# Patient Record
Sex: FEMALE | Race: WHITE | Employment: FULL TIME | URBAN - METROPOLITAN AREA
[De-identification: names, ages, dates, MRNs, and addresses within clinical notes are randomized per-mention and may not be internally consistent; named-entity substitution may affect disease eponyms.]

---

## 2017-07-03 PROBLEM — Z53.20 REFUSAL OF STATIN MEDICATION BY PATIENT: Status: ACTIVE | Noted: 2017-07-03

## 2017-07-03 PROBLEM — E11.3293 MILD NONPROLIFERATIVE DIABETIC RETINOPATHY OF BOTH EYES WITHOUT MACULAR EDEMA ASSOCIATED WITH TYPE 2 DIABETES MELLITUS (HCC): Status: ACTIVE | Noted: 2017-07-03

## 2017-07-03 PROBLEM — Z53.29 REFUSAL OF STATIN MEDICATION BY PATIENT: Status: ACTIVE | Noted: 2017-07-03

## 2017-07-10 PROCEDURE — 82570 ASSAY OF URINE CREATININE: CPT | Performed by: INTERNAL MEDICINE

## 2017-07-10 PROCEDURE — 82043 UR ALBUMIN QUANTITATIVE: CPT | Performed by: INTERNAL MEDICINE

## 2017-08-07 PROBLEM — H26.9 CORTICAL CATARACT OF BOTH EYES: Status: ACTIVE | Noted: 2017-08-07

## 2017-08-07 PROBLEM — E11.9 DIABETES MELLITUS TYPE 2 WITHOUT RETINOPATHY (HCC): Status: ACTIVE | Noted: 2017-08-07

## 2017-08-07 PROBLEM — E11.3293 MILD NONPROLIFERATIVE DIABETIC RETINOPATHY OF BOTH EYES WITHOUT MACULAR EDEMA ASSOCIATED WITH TYPE 2 DIABETES MELLITUS (HCC): Status: RESOLVED | Noted: 2017-07-03 | Resolved: 2017-08-07

## 2018-02-19 PROBLEM — Z86.39 HX OF DIABETIC RETINOPATHY: Status: ACTIVE | Noted: 2018-02-19

## 2018-02-19 PROBLEM — E11.9 DIABETES MELLITUS TYPE 2 WITHOUT RETINOPATHY (HCC): Status: RESOLVED | Noted: 2017-08-07 | Resolved: 2018-02-19

## 2018-03-14 PROCEDURE — 82043 UR ALBUMIN QUANTITATIVE: CPT | Performed by: INTERNAL MEDICINE

## 2018-03-14 PROCEDURE — 82570 ASSAY OF URINE CREATININE: CPT | Performed by: INTERNAL MEDICINE

## 2018-04-16 PROCEDURE — 87086 URINE CULTURE/COLONY COUNT: CPT | Performed by: OBSTETRICS & GYNECOLOGY

## 2018-05-14 PROCEDURE — 87086 URINE CULTURE/COLONY COUNT: CPT | Performed by: OBSTETRICS & GYNECOLOGY

## 2018-06-06 PROBLEM — B37.31 VAGINAL CANDIDIASIS: Status: ACTIVE | Noted: 2018-06-06

## 2018-06-06 PROBLEM — B37.3 VAGINAL CANDIDIASIS: Status: ACTIVE | Noted: 2018-06-06

## 2018-06-06 PROCEDURE — 81015 MICROSCOPIC EXAM OF URINE: CPT | Performed by: UROLOGY

## 2018-12-10 PROBLEM — B37.3 VAGINAL CANDIDIASIS: Status: RESOLVED | Noted: 2018-06-06 | Resolved: 2018-12-10

## 2018-12-10 PROBLEM — B37.31 VAGINAL CANDIDIASIS: Status: RESOLVED | Noted: 2018-06-06 | Resolved: 2018-12-10

## 2020-10-19 PROBLEM — Z86.16 HISTORY OF 2019 NOVEL CORONAVIRUS DISEASE (COVID-19): Status: ACTIVE | Noted: 2020-10-19

## 2021-08-09 PROBLEM — Z79.4 TYPE 2 DIABETES MELLITUS WITH HYPERGLYCEMIA, WITH LONG-TERM CURRENT USE OF INSULIN (HCC): Status: ACTIVE | Noted: 2021-08-09

## 2021-08-09 PROBLEM — E11.65 TYPE 2 DIABETES MELLITUS WITH HYPERGLYCEMIA, WITH LONG-TERM CURRENT USE OF INSULIN (HCC): Status: ACTIVE | Noted: 2021-08-09

## 2022-10-13 ENCOUNTER — HOSPITAL ENCOUNTER (EMERGENCY)
Facility: HOSPITAL | Age: 57
Discharge: HOME | End: 2022-10-14
Attending: EMERGENCY MEDICINE
Payer: COMMERCIAL

## 2022-10-13 ENCOUNTER — APPOINTMENT (OUTPATIENT)
Dept: CT IMAGING | Facility: HOSPITAL | Age: 57
End: 2022-10-13
Attending: EMERGENCY MEDICINE
Payer: COMMERCIAL

## 2022-10-13 DIAGNOSIS — K64.4 EXTERNAL HEMORRHOIDS: ICD-10-CM

## 2022-10-13 DIAGNOSIS — K56.41 IMPACTED STOOL IN RECTUM (HCC): Primary | ICD-10-CM

## 2022-10-13 LAB
ALBUMIN SERPL-MCNC: 3.7 G/DL (ref 3.4–5)
ALBUMIN/GLOB SERPL: 1.1 {RATIO} (ref 1–2)
ALP LIVER SERPL-CCNC: 79 U/L
ALT SERPL-CCNC: 30 U/L
ANION GAP SERPL CALC-SCNC: 7 MMOL/L (ref 0–18)
AST SERPL-CCNC: 13 U/L (ref 15–37)
BASOPHILS # BLD AUTO: 0.01 X10(3) UL (ref 0–0.2)
BASOPHILS NFR BLD AUTO: 0.1 %
BILIRUB SERPL-MCNC: 0.4 MG/DL (ref 0.1–2)
BUN BLD-MCNC: 19 MG/DL (ref 7–18)
BUN/CREAT SERPL: 25.7 (ref 10–20)
CALCIUM BLD-MCNC: 9 MG/DL (ref 8.5–10.1)
CHLORIDE SERPL-SCNC: 111 MMOL/L (ref 98–112)
CO2 SERPL-SCNC: 24 MMOL/L (ref 21–32)
CREAT BLD-MCNC: 0.74 MG/DL
DEPRECATED RDW RBC AUTO: 40.6 FL (ref 35.1–46.3)
EOSINOPHIL # BLD AUTO: 0.04 X10(3) UL (ref 0–0.7)
EOSINOPHIL NFR BLD AUTO: 0.4 %
ERYTHROCYTE [DISTWIDTH] IN BLOOD BY AUTOMATED COUNT: 12.5 % (ref 11–15)
GFR SERPLBLD BASED ON 1.73 SQ M-ARVRAT: 94 ML/MIN/1.73M2 (ref 60–?)
GLOBULIN PLAS-MCNC: 3.5 G/DL (ref 2.8–4.4)
GLUCOSE BLD-MCNC: 184 MG/DL (ref 70–99)
GLUCOSE BLDC GLUCOMTR-MCNC: 176 MG/DL (ref 70–99)
HCT VFR BLD AUTO: 43.3 %
HGB BLD-MCNC: 14.3 G/DL
IMM GRANULOCYTES # BLD AUTO: 0.03 X10(3) UL (ref 0–1)
IMM GRANULOCYTES NFR BLD: 0.3 %
LYMPHOCYTES # BLD AUTO: 1.62 X10(3) UL (ref 1–4)
LYMPHOCYTES NFR BLD AUTO: 14.5 %
MCH RBC QN AUTO: 29.2 PG (ref 26–34)
MCHC RBC AUTO-ENTMCNC: 33 G/DL (ref 31–37)
MCV RBC AUTO: 88.4 FL
MONOCYTES # BLD AUTO: 0.57 X10(3) UL (ref 0.1–1)
MONOCYTES NFR BLD AUTO: 5.1 %
NEUTROPHILS # BLD AUTO: 8.88 X10 (3) UL (ref 1.5–7.7)
NEUTROPHILS # BLD AUTO: 8.88 X10(3) UL (ref 1.5–7.7)
NEUTROPHILS NFR BLD AUTO: 79.6 %
OSMOLALITY SERPL CALC.SUM OF ELEC: 301 MOSM/KG (ref 275–295)
PLATELET # BLD AUTO: 213 10(3)UL (ref 150–450)
POTASSIUM SERPL-SCNC: 3.8 MMOL/L (ref 3.5–5.1)
PROT SERPL-MCNC: 7.2 G/DL (ref 6.4–8.2)
RBC # BLD AUTO: 4.9 X10(6)UL
SODIUM SERPL-SCNC: 142 MMOL/L (ref 136–145)
WBC # BLD AUTO: 11.2 X10(3) UL (ref 4–11)

## 2022-10-13 PROCEDURE — 99284 EMERGENCY DEPT VISIT MOD MDM: CPT

## 2022-10-13 PROCEDURE — 85025 COMPLETE CBC W/AUTO DIFF WBC: CPT | Performed by: EMERGENCY MEDICINE

## 2022-10-13 PROCEDURE — 74177 CT ABD & PELVIS W/CONTRAST: CPT | Performed by: EMERGENCY MEDICINE

## 2022-10-13 PROCEDURE — 80053 COMPREHEN METABOLIC PANEL: CPT | Performed by: EMERGENCY MEDICINE

## 2022-10-13 PROCEDURE — 82962 GLUCOSE BLOOD TEST: CPT

## 2022-10-13 PROCEDURE — 85025 COMPLETE CBC W/AUTO DIFF WBC: CPT

## 2022-10-13 PROCEDURE — 36415 COLL VENOUS BLD VENIPUNCTURE: CPT

## 2022-10-13 PROCEDURE — 80053 COMPREHEN METABOLIC PANEL: CPT

## 2022-10-13 RX ORDER — MINERAL OIL, PETROLATUM, PHENYLEPHRINE HCL 2.5; 140; 749 MG/G; MG/G; MG/G
1 OINTMENT TOPICAL 2 TIMES DAILY PRN
Qty: 28 G | Refills: 0 | Status: SHIPPED | OUTPATIENT
Start: 2022-10-13

## 2022-10-13 RX ORDER — DOCUSATE SODIUM 100 MG/1
100 CAPSULE, LIQUID FILLED ORAL 2 TIMES DAILY
Qty: 60 CAPSULE | Refills: 0 | Status: SHIPPED | OUTPATIENT
Start: 2022-10-13 | End: 2022-11-12

## 2022-10-14 VITALS
HEART RATE: 68 BPM | DIASTOLIC BLOOD PRESSURE: 78 MMHG | SYSTOLIC BLOOD PRESSURE: 162 MMHG | RESPIRATION RATE: 20 BRPM | WEIGHT: 170 LBS | OXYGEN SATURATION: 100 % | BODY MASS INDEX: 27 KG/M2 | TEMPERATURE: 98 F

## 2022-10-14 RX ORDER — POLYETHYLENE GLYCOL 3350 17 G/17G
17 POWDER, FOR SOLUTION ORAL DAILY PRN
Qty: 850 G | Refills: 0 | Status: SHIPPED | OUTPATIENT
Start: 2022-10-14 | End: 2022-10-19

## 2022-10-14 NOTE — ED INITIAL ASSESSMENT (HPI)
AOx4. Complaints of constipation, LBM Sunday. Also reporting bright red bloody stools xs several episodes. Reports hemorrhoids but states 'but theres lots of blood in the toilet'. -thinners. Colonoscopy 7 years ago.     bg [id number]

## 2022-10-15 ENCOUNTER — HOSPITAL ENCOUNTER (EMERGENCY)
Facility: HOSPITAL | Age: 57
Discharge: HOME | End: 2022-10-16
Attending: EMERGENCY MEDICINE
Payer: COMMERCIAL

## 2022-10-15 DIAGNOSIS — K59.00 CONSTIPATION, UNSPECIFIED CONSTIPATION TYPE: Primary | ICD-10-CM

## 2022-10-15 PROCEDURE — 99284 EMERGENCY DEPT VISIT MOD MDM: CPT

## 2022-10-16 ENCOUNTER — APPOINTMENT (OUTPATIENT)
Dept: CT IMAGING | Facility: HOSPITAL | Age: 57
End: 2022-10-16
Attending: EMERGENCY MEDICINE
Payer: COMMERCIAL

## 2022-10-16 VITALS
HEIGHT: 67 IN | TEMPERATURE: 99 F | HEART RATE: 100 BPM | DIASTOLIC BLOOD PRESSURE: 95 MMHG | OXYGEN SATURATION: 97 % | SYSTOLIC BLOOD PRESSURE: 149 MMHG | BODY MASS INDEX: 25.9 KG/M2 | WEIGHT: 165 LBS | RESPIRATION RATE: 18 BRPM

## 2022-10-16 PROCEDURE — 74176 CT ABD & PELVIS W/O CONTRAST: CPT | Performed by: EMERGENCY MEDICINE

## 2022-10-16 NOTE — ED INITIAL ASSESSMENT (HPI)
Patient to ER from home with c/o constipation. Last bowel movement was 10/9/22. Patient seen here Thursday for same and given enemas without relief. Patient states she has been taking docusate, miralax and doing enemas at home without relief. Patient states now continues to have pain to left lower abdomen.

## 2023-02-27 ENCOUNTER — LAB ENCOUNTER (OUTPATIENT)
Dept: LAB | Facility: HOSPITAL | Age: 58
End: 2023-02-27
Attending: INTERNAL MEDICINE
Payer: COMMERCIAL

## 2023-02-27 DIAGNOSIS — Z20.822 ENCOUNTER FOR PREOPERATIVE SCREENING LABORATORY TESTING FOR COVID-19 VIRUS: ICD-10-CM

## 2023-02-27 DIAGNOSIS — Z01.812 ENCOUNTER FOR PREOPERATIVE SCREENING LABORATORY TESTING FOR COVID-19 VIRUS: ICD-10-CM

## 2023-02-27 LAB — SARS-COV-2 RNA RESP QL NAA+PROBE: NOT DETECTED

## 2023-02-27 RX ORDER — LINACLOTIDE 72 UG/1
CAPSULE, GELATIN COATED ORAL NIGHTLY
COMMUNITY

## 2023-03-01 ENCOUNTER — ANESTHESIA EVENT (OUTPATIENT)
Dept: ENDOSCOPY | Facility: HOSPITAL | Age: 58
End: 2023-03-01
Payer: COMMERCIAL

## 2023-03-02 ENCOUNTER — ANESTHESIA (OUTPATIENT)
Dept: ENDOSCOPY | Facility: HOSPITAL | Age: 58
End: 2023-03-02
Payer: COMMERCIAL

## 2023-03-02 ENCOUNTER — HOSPITAL ENCOUNTER (OUTPATIENT)
Facility: HOSPITAL | Age: 58
Setting detail: HOSPITAL OUTPATIENT SURGERY
Discharge: HOME | End: 2023-03-02
Attending: INTERNAL MEDICINE | Admitting: INTERNAL MEDICINE
Payer: COMMERCIAL

## 2023-03-02 VITALS
HEART RATE: 82 BPM | WEIGHT: 170 LBS | DIASTOLIC BLOOD PRESSURE: 60 MMHG | SYSTOLIC BLOOD PRESSURE: 121 MMHG | BODY MASS INDEX: 26.68 KG/M2 | OXYGEN SATURATION: 100 % | HEIGHT: 67 IN | TEMPERATURE: 98 F | RESPIRATION RATE: 16 BRPM

## 2023-03-02 DIAGNOSIS — Z20.822 ENCOUNTER FOR PREOPERATIVE SCREENING LABORATORY TESTING FOR COVID-19 VIRUS: Primary | ICD-10-CM

## 2023-03-02 DIAGNOSIS — Z01.812 ENCOUNTER FOR PREOPERATIVE SCREENING LABORATORY TESTING FOR COVID-19 VIRUS: Primary | ICD-10-CM

## 2023-03-02 LAB — GLUCOSE BLD-MCNC: 176 MG/DL (ref 70–99)

## 2023-03-02 PROCEDURE — 0DB68ZX EXCISION OF STOMACH, VIA NATURAL OR ARTIFICIAL OPENING ENDOSCOPIC, DIAGNOSTIC: ICD-10-PCS | Performed by: INTERNAL MEDICINE

## 2023-03-02 PROCEDURE — 88305 TISSUE EXAM BY PATHOLOGIST: CPT | Performed by: INTERNAL MEDICINE

## 2023-03-02 PROCEDURE — 82962 GLUCOSE BLOOD TEST: CPT

## 2023-03-02 PROCEDURE — 0DB88ZX EXCISION OF SMALL INTESTINE, VIA NATURAL OR ARTIFICIAL OPENING ENDOSCOPIC, DIAGNOSTIC: ICD-10-PCS | Performed by: INTERNAL MEDICINE

## 2023-03-02 RX ORDER — SODIUM CHLORIDE, SODIUM LACTATE, POTASSIUM CHLORIDE, CALCIUM CHLORIDE 600; 310; 30; 20 MG/100ML; MG/100ML; MG/100ML; MG/100ML
INJECTION, SOLUTION INTRAVENOUS CONTINUOUS
Status: DISCONTINUED | OUTPATIENT
Start: 2023-03-02 | End: 2023-03-02

## 2023-03-02 RX ORDER — LIDOCAINE HYDROCHLORIDE 10 MG/ML
INJECTION, SOLUTION EPIDURAL; INFILTRATION; INTRACAUDAL; PERINEURAL AS NEEDED
Status: DISCONTINUED | OUTPATIENT
Start: 2023-03-02 | End: 2023-03-02 | Stop reason: SURG

## 2023-03-02 RX ORDER — NICOTINE POLACRILEX 4 MG
15 LOZENGE BUCCAL
Status: DISCONTINUED | OUTPATIENT
Start: 2023-03-02 | End: 2023-03-02

## 2023-03-02 RX ORDER — NICOTINE POLACRILEX 4 MG
30 LOZENGE BUCCAL
Status: DISCONTINUED | OUTPATIENT
Start: 2023-03-02 | End: 2023-03-02

## 2023-03-02 RX ORDER — DEXTROSE MONOHYDRATE 25 G/50ML
50 INJECTION, SOLUTION INTRAVENOUS
Status: DISCONTINUED | OUTPATIENT
Start: 2023-03-02 | End: 2023-03-02

## 2023-03-02 RX ADMIN — LIDOCAINE HYDROCHLORIDE 25 MG: 10 INJECTION, SOLUTION EPIDURAL; INFILTRATION; INTRACAUDAL; PERINEURAL at 12:51:00

## 2023-03-02 RX ADMIN — SODIUM CHLORIDE, SODIUM LACTATE, POTASSIUM CHLORIDE, CALCIUM CHLORIDE: 600; 310; 30; 20 INJECTION, SOLUTION INTRAVENOUS at 12:47:00

## 2023-03-02 NOTE — ANESTHESIA POSTPROCEDURE EVALUATION
[address] Patient Status:  Hospital Outpatient Surgery   Age/Gender 62year old female MRN [id number]   Location [address] [address] Attending No att. providers found   [address] Day # 0 PCP Oumou Caldwell MD       Anesthesia Post-op Note    ESOPHAGOGASTRODUODENOSCOPY WITH BIOPSIES    Procedure Summary     Date: 03/02/23 Room / Location: [facility] ENDOSCOPY 03 / [facility] ENDOSCOPY    Anesthesia Start: 1186 Anesthesia Stop: 1260    Procedure: ESOPHAGOGASTRODUODENOSCOPY WITH BIOPSIES Diagnosis: (GASTRITIS)    Surgeons: Katherine Kurtz MD Anesthesiologist: Omar Coleman MD    Anesthesia Type: MAC ASA Status: 3          Anesthesia Type: MAC    Vitals Value Taken Time   /60 03/02/23 1327   Temp 98.0 03/02/23 1330   Pulse 82 03/02/23 1330   Resp 16 03/02/23 1330   SpO2 100 % 03/02/23 1330       Patient Location: Endoscopy    Anesthesia Type: MAC    Airway Patency: patent    Postop Pain Control: adequate    Mental Status: mildly sedated but able to meaningfully participate in the post-anesthesia evaluation    Nausea/Vomiting: none    Cardiopulmonary/Hydration status: stable euvolemic    Complications: no apparent anesthesia related complications    Postop vital signs: stable    Dental Exam: Unchanged from Preop    Patient to be discharged home when criteria met.

## 2023-03-02 NOTE — OPERATIVE REPORT
OPERATIVE REPORT   PATIENT NAME: Peyton Rolon  MRN: [id number]  DATE OF OPERATION: 3/2/2023  PREOPERATIVE DIAGNOSIS: upper abdominal pain; abnormal CT abdomen showing possible polyp  POSTOPERATIVE DIAGNOSIS:    1.  No polyp in stomach   2. Mild gastritis  PROCEDURE PERFORMED: Esophagogastroduodenoscopy  SEDATION MEDICATIONS: MAC  MODERATE SEDATION TIME: None. Deep sedation provided by anesthesia  PREPROCEDURE ASSESSMENT: The indication for this procedure is to assess for polyp. The patient was identified by myself and nursing staff in the exam room. Informed consent was obtained. The patient was seen in clinic and a full H&P was obtained. On brief physical examination, airway is patent. Chest is clear. Heart has regular rate and rhythm. Abdomen is soft, nontender with good bowel sounds. A medication list was taken by nursing today and reviewed by myself. The patient is an ASA grade 2. PROCEDURE NOTE: The procedure was completed without difficulty. The patient tolerated the procedure well. The endoscope was inserted through the mouth and advanced to the level of the duodenum, 3rd portion. Esophagus appeared normal without stricture or esophagitis. No hiatal hernia or Hernandez's esophagus was noted. Stomach was normal in the antrum and the body except for mild gastritis. Careful look showed no polyps or bulge. Duodenum was normal in the bulb and 2nd duodenum. Retroflexed view in the stomach revealed normal fundus and cardia. Small bowel biopsies were taken for celiac sprue and gastric biopsies were taken for Helicobacter pylori. There were no immediate complications. FINDINGS   1. No polyp seen- likely CT scan finding is related to gastric fold  RECOMMENDATIONS:   DISCHARGE:  On discharge, the patient was given an after-visit summary detailing the procedure, findings, followup plans, and an updated medication list.     Thank you very much for the consultation. I really appreciate it.     Wojciech Gunderson MD

## 2023-03-02 NOTE — DISCHARGE INSTRUCTIONS
Home Care Instructions for Colonoscopy and/or Gastroscopy With Sedation    Diet:  - Resume your regular diet as tolerated unless otherwise instructed. - start with light meals to minimize bloating.  - Do not drink alcohol today. Medication:  - If you have questions about resuming your normal medications, please contact your Primary Care Physician. Activities:  - Take it easy today. Do not return to work today. - Do not drive today. - Do not operate any machinery today (including kitchen equipment). Colonoscopy:  - You may notice some rectal \"spotting\" (a little blood on the toilet tissue) for a day or two after the exam. This is normal.  - If you experience any rectal bleeding (not spotting), persistent tenderness or sharp severe abdominal pains, oral temperature over 100 degrees Farenheit, light-headedness or dizziness, or any other problems, contact your doctor. Gastroscopy:  - You may have a sore throat for 2-3 days following the exam. This is normal. Gargling with warm salt water (1/2 tsp salt to 1 glass warm water) or using throat lozenges will help. - If you experience any sharp pain in your neck, abdomen or chest, vomiting of blood, oral temperature over 100 degrees Farenheit, light-headedness or dizziness, or any other problems, contact your doctor. **If unable to reach your doctor, please go to the [facility] Emergency Room**    - Your referring physician will receive a full report of your examination.  - If you do not hear from your doctor's office within two weeks of your biopsy, please call them for your results.     Additional Comments/Instructions (if applicable):

## 2023-04-20 RX ORDER — ATORVASTATIN CALCIUM 20 MG/1
20 TABLET, FILM COATED ORAL DAILY
Qty: 30 TABLET | Refills: 11 | COMMUNITY
Start: 2023-04-17 | End: 2023-04-26

## 2023-04-20 RX ORDER — METOPROLOL SUCCINATE 25 MG/1
25 TABLET, EXTENDED RELEASE ORAL DAILY
Qty: 30 TABLET | Refills: 11 | COMMUNITY
Start: 2023-04-17 | End: 2024-04-16

## 2023-04-20 RX ORDER — ASPIRIN 81 MG/1
81 TABLET ORAL DAILY
COMMUNITY
Start: 2023-04-17

## 2023-04-20 RX ORDER — AMLODIPINE BESYLATE 2.5 MG/1
2.5 TABLET ORAL DAILY
COMMUNITY
Start: 2023-02-09

## 2023-04-23 ENCOUNTER — LAB ENCOUNTER (OUTPATIENT)
Dept: LAB | Facility: HOSPITAL | Age: 58
End: 2023-04-23
Attending: INTERNAL MEDICINE
Payer: COMMERCIAL

## 2023-04-23 DIAGNOSIS — Z01.818 PRE-OP TESTING: ICD-10-CM

## 2023-04-23 LAB
ANION GAP SERPL CALC-SCNC: 7 MMOL/L (ref 0–18)
BUN BLD-MCNC: 16 MG/DL (ref 7–18)
BUN/CREAT SERPL: 21.1 (ref 10–20)
CALCIUM BLD-MCNC: 8.5 MG/DL (ref 8.5–10.1)
CHLORIDE SERPL-SCNC: 112 MMOL/L (ref 98–112)
CO2 SERPL-SCNC: 26 MMOL/L (ref 21–32)
CREAT BLD-MCNC: 0.76 MG/DL
DEPRECATED RDW RBC AUTO: 41.1 FL (ref 35.1–46.3)
ERYTHROCYTE [DISTWIDTH] IN BLOOD BY AUTOMATED COUNT: 12.5 % (ref 11–15)
FASTING STATUS PATIENT QL REPORTED: NO
GFR SERPLBLD BASED ON 1.73 SQ M-ARVRAT: 91 ML/MIN/1.73M2 (ref 60–?)
GLUCOSE BLD-MCNC: 123 MG/DL (ref 70–99)
HCT VFR BLD AUTO: 42.8 %
HGB BLD-MCNC: 13.8 G/DL
MCH RBC QN AUTO: 28.9 PG (ref 26–34)
MCHC RBC AUTO-ENTMCNC: 32.2 G/DL (ref 31–37)
MCV RBC AUTO: 89.7 FL
OSMOLALITY SERPL CALC.SUM OF ELEC: 303 MOSM/KG (ref 275–295)
PLATELET # BLD AUTO: 198 10(3)UL (ref 150–450)
POTASSIUM SERPL-SCNC: 3.8 MMOL/L (ref 3.5–5.1)
RBC # BLD AUTO: 4.77 X10(6)UL
SODIUM SERPL-SCNC: 145 MMOL/L (ref 136–145)
WBC # BLD AUTO: 7.2 X10(3) UL (ref 4–11)

## 2023-04-23 PROCEDURE — 80048 BASIC METABOLIC PNL TOTAL CA: CPT

## 2023-04-23 PROCEDURE — 85027 COMPLETE CBC AUTOMATED: CPT

## 2023-04-23 PROCEDURE — 36415 COLL VENOUS BLD VENIPUNCTURE: CPT

## 2023-04-25 ENCOUNTER — HOSPITAL ENCOUNTER (OUTPATIENT)
Dept: INTERVENTIONAL RADIOLOGY/VASCULAR | Facility: HOSPITAL | Age: 58
Setting detail: OBSERVATION
Discharge: HOME | End: 2023-04-26
Attending: INTERNAL MEDICINE | Admitting: INTERNAL MEDICINE
Payer: COMMERCIAL

## 2023-04-25 DIAGNOSIS — I25.10 CAD (CORONARY ARTERY DISEASE): ICD-10-CM

## 2023-04-25 DIAGNOSIS — Z01.818 PRE-OP TESTING: Primary | ICD-10-CM

## 2023-04-25 LAB
ATRIAL RATE: 69 BPM
GLUCOSE BLDC GLUCOMTR-MCNC: 128 MG/DL (ref 70–99)
GLUCOSE BLDC GLUCOMTR-MCNC: 144 MG/DL (ref 70–99)
GLUCOSE BLDC GLUCOMTR-MCNC: 264 MG/DL (ref 70–99)
P AXIS: 49 DEGREES
P-R INTERVAL: 150 MS
Q-T INTERVAL: 418 MS
QRS DURATION: 74 MS
QTC CALCULATION (BEZET): 447 MS
R AXIS: 61 DEGREES
T AXIS: 80 DEGREES
VENTRICULAR RATE: 69 BPM

## 2023-04-25 PROCEDURE — 93010 ELECTROCARDIOGRAM REPORT: CPT | Performed by: INTERNAL MEDICINE

## 2023-04-25 PROCEDURE — 92978 ENDOLUMINL IVUS OCT C 1ST: CPT | Performed by: INTERNAL MEDICINE

## 2023-04-25 PROCEDURE — B2111ZZ FLUOROSCOPY OF MULTIPLE CORONARY ARTERIES USING LOW OSMOLAR CONTRAST: ICD-10-PCS | Performed by: INTERNAL MEDICINE

## 2023-04-25 PROCEDURE — 82962 GLUCOSE BLOOD TEST: CPT

## 2023-04-25 PROCEDURE — 027034Z DILATION OF CORONARY ARTERY, ONE ARTERY WITH DRUG-ELUTING INTRALUMINAL DEVICE, PERCUTANEOUS APPROACH: ICD-10-PCS | Performed by: INTERNAL MEDICINE

## 2023-04-25 PROCEDURE — 99153 MOD SED SAME PHYS/QHP EA: CPT | Performed by: INTERNAL MEDICINE

## 2023-04-25 PROCEDURE — 93454 CORONARY ARTERY ANGIO S&I: CPT | Performed by: INTERNAL MEDICINE

## 2023-04-25 PROCEDURE — 93005 ELECTROCARDIOGRAM TRACING: CPT

## 2023-04-25 PROCEDURE — 99152 MOD SED SAME PHYS/QHP 5/>YRS: CPT | Performed by: INTERNAL MEDICINE

## 2023-04-25 RX ORDER — ONDANSETRON 4 MG/1
4 TABLET, ORALLY DISINTEGRATING ORAL EVERY 6 HOURS PRN
Status: DISCONTINUED | OUTPATIENT
Start: 2023-04-25 | End: 2023-04-26

## 2023-04-25 RX ORDER — NITROGLYCERIN 20 MG/100ML
INJECTION INTRAVENOUS
Status: COMPLETED
Start: 2023-04-25 | End: 2023-04-25

## 2023-04-25 RX ORDER — NICOTINE POLACRILEX 4 MG
30 LOZENGE BUCCAL
Status: DISCONTINUED | OUTPATIENT
Start: 2023-04-25 | End: 2023-04-26

## 2023-04-25 RX ORDER — ACETAMINOPHEN 325 MG/1
650 TABLET ORAL EVERY 6 HOURS PRN
Status: DISCONTINUED | OUTPATIENT
Start: 2023-04-25 | End: 2023-04-26

## 2023-04-25 RX ORDER — HYDROCODONE BITARTRATE AND ACETAMINOPHEN 5; 325 MG/1; MG/1
1 TABLET ORAL EVERY 6 HOURS PRN
Status: DISCONTINUED | OUTPATIENT
Start: 2023-04-25 | End: 2023-04-26

## 2023-04-25 RX ORDER — HEPARIN SODIUM 1000 [USP'U]/ML
INJECTION, SOLUTION INTRAVENOUS; SUBCUTANEOUS
Status: COMPLETED
Start: 2023-04-25 | End: 2023-04-25

## 2023-04-25 RX ORDER — VERAPAMIL HYDROCHLORIDE 2.5 MG/ML
INJECTION, SOLUTION INTRAVENOUS
Status: COMPLETED
Start: 2023-04-25 | End: 2023-04-25

## 2023-04-25 RX ORDER — LIDOCAINE HYDROCHLORIDE 20 MG/ML
INJECTION, SOLUTION EPIDURAL; INFILTRATION; INTRACAUDAL; PERINEURAL
Status: COMPLETED
Start: 2023-04-25 | End: 2023-04-25

## 2023-04-25 RX ORDER — SODIUM CHLORIDE 9 MG/ML
INJECTION, SOLUTION INTRAVENOUS
Status: COMPLETED | OUTPATIENT
Start: 2023-04-25 | End: 2023-04-25

## 2023-04-25 RX ORDER — MIDAZOLAM HYDROCHLORIDE 1 MG/ML
INJECTION INTRAMUSCULAR; INTRAVENOUS
Status: COMPLETED
Start: 2023-04-25 | End: 2023-04-25

## 2023-04-25 RX ORDER — NICOTINE POLACRILEX 4 MG
15 LOZENGE BUCCAL
Status: DISCONTINUED | OUTPATIENT
Start: 2023-04-25 | End: 2023-04-26

## 2023-04-25 RX ORDER — LOSARTAN POTASSIUM 100 MG/1
100 TABLET ORAL EVERY EVENING
Status: DISCONTINUED | OUTPATIENT
Start: 2023-04-25 | End: 2023-04-26

## 2023-04-25 RX ORDER — SODIUM CHLORIDE 9 MG/ML
INJECTION, SOLUTION INTRAVENOUS CONTINUOUS
Status: ACTIVE | OUTPATIENT
Start: 2023-04-25 | End: 2023-04-25

## 2023-04-25 RX ORDER — ONDANSETRON 2 MG/ML
4 INJECTION INTRAMUSCULAR; INTRAVENOUS EVERY 6 HOURS PRN
Status: DISCONTINUED | OUTPATIENT
Start: 2023-04-25 | End: 2023-04-25

## 2023-04-25 RX ORDER — ASPIRIN 81 MG/1
81 TABLET ORAL DAILY
Status: DISCONTINUED | OUTPATIENT
Start: 2023-04-26 | End: 2023-04-26

## 2023-04-25 RX ORDER — METOPROLOL SUCCINATE 25 MG/1
25 TABLET, EXTENDED RELEASE ORAL DAILY
Status: DISCONTINUED | OUTPATIENT
Start: 2023-04-25 | End: 2023-04-26

## 2023-04-25 RX ORDER — ASPIRIN 81 MG/1
81 TABLET ORAL DAILY
Status: DISCONTINUED | OUTPATIENT
Start: 2023-04-25 | End: 2023-04-25

## 2023-04-25 RX ORDER — HYDRALAZINE HYDROCHLORIDE 20 MG/ML
10 INJECTION INTRAMUSCULAR; INTRAVENOUS EVERY 6 HOURS PRN
Status: DISCONTINUED | OUTPATIENT
Start: 2023-04-25 | End: 2023-04-26

## 2023-04-25 RX ORDER — AMLODIPINE BESYLATE 2.5 MG/1
2.5 TABLET ORAL DAILY
Status: DISCONTINUED | OUTPATIENT
Start: 2023-04-25 | End: 2023-04-26

## 2023-04-25 RX ORDER — ATORVASTATIN CALCIUM 40 MG/1
40 TABLET, FILM COATED ORAL NIGHTLY
Status: DISCONTINUED | OUTPATIENT
Start: 2023-04-25 | End: 2023-04-26

## 2023-04-25 RX ORDER — BIVALIRUDIN 250 MG/5ML
INJECTION, POWDER, LYOPHILIZED, FOR SOLUTION INTRAVENOUS
Status: COMPLETED
Start: 2023-04-25 | End: 2023-04-25

## 2023-04-25 RX ORDER — DEXTROSE MONOHYDRATE 25 G/50ML
50 INJECTION, SOLUTION INTRAVENOUS
Status: DISCONTINUED | OUTPATIENT
Start: 2023-04-25 | End: 2023-04-26

## 2023-04-25 RX ORDER — ONDANSETRON 2 MG/ML
4 INJECTION INTRAMUSCULAR; INTRAVENOUS EVERY 6 HOURS PRN
Status: DISCONTINUED | OUTPATIENT
Start: 2023-04-25 | End: 2023-04-26

## 2023-04-25 RX ADMIN — LOSARTAN POTASSIUM 100 MG: 100 TABLET ORAL at 18:24:00

## 2023-04-25 RX ADMIN — ATORVASTATIN CALCIUM 40 MG: 40 TABLET, FILM COATED ORAL at 20:02:00

## 2023-04-25 RX ADMIN — SODIUM CHLORIDE: 9 INJECTION, SOLUTION INTRAVENOUS at 12:00:00

## 2023-04-25 RX ADMIN — ONDANSETRON 4 MG: 2 INJECTION INTRAMUSCULAR; INTRAVENOUS at 22:11:00

## 2023-04-25 RX ADMIN — METOPROLOL SUCCINATE 25 MG: 25 TABLET, EXTENDED RELEASE ORAL at 18:25:00

## 2023-04-25 RX ADMIN — AMLODIPINE BESYLATE 2.5 MG: 2.5 TABLET ORAL at 18:24:00

## 2023-04-25 NOTE — IVS NOTE
[place]  [id number]  4/25/2023    Post procedure/ recovery hand-off report given to Sakshi SORENSON. Patient's vital signs stable, access site dry and intact, no signs and symptoms of bleeding/ hematoma.     Kika Singh RN,

## 2023-04-25 NOTE — INTERVAL H&P NOTE
Pre-op Diagnosis: * No pre-op diagnosis entered *    The above referenced H&P was reviewed by Libertad Caldwell MD on 4/25/2023, the patient was examined and no significant changes have occurred in the patient's condition since the H&P was performed. I discussed with the patient and/or legal representative the potential benefits, risks and side effects of this procedure; the likelihood of the patient achieving goals; and potential problems that might occur during recuperation. I discussed reasonable alternatives to the procedure, including risks, benefits and side effects related to the alternatives and risks related to not receiving this procedure. We will proceed with procedure as planned.

## 2023-04-25 NOTE — PLAN OF CARE
Patient came in from cath lab this evening. Patient is A&Ox4, room air, stand by assist. TR band is in R wrist 4CC left. Plan to have an EKG tomorrow morning and to go home once medically cleared. Call light within reach and fall precautions in place.      Problem: Patient/Family Goals  Goal: Patient/Family Long Term Goal  Description: Patient's Long Term Goal: To go back home    Interventions:  - Follow medical regimen  - See additional Care Plan goals for specific interventions  Outcome: Progressing  Goal: Patient/Family Short Term Goal  Description: Patient's Short Term Goal: To recover from cath     Interventions:   - Cardiac consult  - See additional Care Plan goals for specific interventions  Outcome: Progressing     Problem: PAIN - ADULT  Goal: Verbalizes/displays adequate comfort level or patient's stated pain goal  Description: INTERVENTIONS:  - Encourage pt to monitor pain and request assistance  - Assess pain using appropriate pain scale  - Administer analgesics based on type and severity of pain and evaluate response  - Implement non-pharmacological measures as appropriate and evaluate response  - Consider cultural and social influences on pain and pain management  - Manage/alleviate anxiety  - Utilize distraction and/or relaxation techniques  - Monitor for opioid side effects  - Notify MD/LIP if interventions unsuccessful or patient reports new pain  - Anticipate increased pain with activity and pre-medicate as appropriate  Outcome: Progressing     Problem: CARDIOVASCULAR - ADULT  Goal: Maintains optimal cardiac output and hemodynamic stability  Description: INTERVENTIONS:  - Monitor vital signs, rhythm, and trends  - Monitor for bleeding, hypotension and signs of decreased cardiac output  - Evaluate effectiveness of vasoactive medications to optimize hemodynamic stability  - Monitor arterial and/or venous puncture sites for bleeding and/or hematoma  - Assess quality of pulses, skin color and temperature  - Assess for signs of decreased coronary artery perfusion - ex.  Angina  - Evaluate fluid balance, assess for edema, trend weights  Outcome: Progressing  Goal: Absence of cardiac arrhythmias or at baseline  Description: INTERVENTIONS:  - Continuous cardiac monitoring, monitor vital signs, obtain 12 lead EKG if indicated  - Evaluate effectiveness of antiarrhythmic and heart rate control medications as ordered  - Initiate emergency measures for life threatening arrhythmias  - Monitor electrolytes and administer replacement therapy as ordered  Outcome: Progressing

## 2023-04-26 VITALS
SYSTOLIC BLOOD PRESSURE: 158 MMHG | DIASTOLIC BLOOD PRESSURE: 76 MMHG | HEIGHT: 67 IN | OXYGEN SATURATION: 98 % | HEART RATE: 68 BPM | WEIGHT: 199.38 LBS | BODY MASS INDEX: 31.29 KG/M2 | TEMPERATURE: 98 F | RESPIRATION RATE: 18 BRPM

## 2023-04-26 LAB
ANION GAP SERPL CALC-SCNC: 8 MMOL/L (ref 0–18)
ATRIAL RATE: 68 BPM
BUN BLD-MCNC: 12 MG/DL (ref 7–18)
BUN/CREAT SERPL: 19.4 (ref 10–20)
CALCIUM BLD-MCNC: 8.6 MG/DL (ref 8.5–10.1)
CHLORIDE SERPL-SCNC: 107 MMOL/L (ref 98–112)
CO2 SERPL-SCNC: 27 MMOL/L (ref 21–32)
CREAT BLD-MCNC: 0.62 MG/DL
DEPRECATED RDW RBC AUTO: 38.1 FL (ref 35.1–46.3)
ERYTHROCYTE [DISTWIDTH] IN BLOOD BY AUTOMATED COUNT: 12.4 % (ref 11–15)
GFR SERPLBLD BASED ON 1.73 SQ M-ARVRAT: 104 ML/MIN/1.73M2 (ref 60–?)
GLUCOSE BLD-MCNC: 296 MG/DL (ref 70–99)
GLUCOSE BLDC GLUCOMTR-MCNC: 277 MG/DL (ref 70–99)
HCT VFR BLD AUTO: 40.9 %
HGB BLD-MCNC: 14 G/DL
MCH RBC QN AUTO: 29.3 PG (ref 26–34)
MCHC RBC AUTO-ENTMCNC: 34.2 G/DL (ref 31–37)
MCV RBC AUTO: 85.6 FL
OSMOLALITY SERPL CALC.SUM OF ELEC: 305 MOSM/KG (ref 275–295)
P AXIS: 51 DEGREES
P-R INTERVAL: 142 MS
PLATELET # BLD AUTO: 197 10(3)UL (ref 150–450)
POTASSIUM SERPL-SCNC: 3.8 MMOL/L (ref 3.5–5.1)
Q-T INTERVAL: 424 MS
QRS DURATION: 82 MS
QTC CALCULATION (BEZET): 450 MS
R AXIS: 31 DEGREES
RBC # BLD AUTO: 4.78 X10(6)UL
SODIUM SERPL-SCNC: 142 MMOL/L (ref 136–145)
T AXIS: 98 DEGREES
VENTRICULAR RATE: 68 BPM
WBC # BLD AUTO: 10.7 X10(3) UL (ref 4–11)

## 2023-04-26 PROCEDURE — 93005 ELECTROCARDIOGRAM TRACING: CPT

## 2023-04-26 PROCEDURE — 82962 GLUCOSE BLOOD TEST: CPT

## 2023-04-26 PROCEDURE — 93010 ELECTROCARDIOGRAM REPORT: CPT | Performed by: INTERNAL MEDICINE

## 2023-04-26 PROCEDURE — 85027 COMPLETE CBC AUTOMATED: CPT | Performed by: INTERNAL MEDICINE

## 2023-04-26 PROCEDURE — 80048 BASIC METABOLIC PNL TOTAL CA: CPT | Performed by: INTERNAL MEDICINE

## 2023-04-26 RX ORDER — LOSARTAN POTASSIUM 100 MG/1
100 TABLET ORAL EVERY EVENING
Qty: 90 TABLET | Refills: 3 | Status: SHIPPED | OUTPATIENT
Start: 2023-04-26

## 2023-04-26 RX ORDER — ATORVASTATIN CALCIUM 40 MG/1
40 TABLET, FILM COATED ORAL NIGHTLY
Qty: 90 TABLET | Refills: 3 | Status: SHIPPED | OUTPATIENT
Start: 2023-04-26

## 2023-04-26 RX ADMIN — AMLODIPINE BESYLATE 2.5 MG: 2.5 TABLET ORAL at 08:03:00

## 2023-04-26 RX ADMIN — METOPROLOL SUCCINATE 25 MG: 25 TABLET, EXTENDED RELEASE ORAL at 08:03:00

## 2023-04-26 RX ADMIN — ASPIRIN 81 MG: 81 TABLET ORAL at 08:03:00

## 2023-04-26 NOTE — DISCHARGE PLANNING
Patient was provided with discharge instructions, education, and follow up information. Prescriptions were already sent electronically to patient's pharmacy. Patient verbalizes understanding of follow up information, specifically cardiology follow up. Post cardiac cath instructions reviewed with patient. Patient has no questions after reviewing all instructions and will be going home.      Ct Galan, Discharge Leader [id number]

## 2023-04-26 NOTE — PLAN OF CARE
Problem: Patient/Family Goals  Goal: Patient/Family Long Term Goal  Description: Patient's Long Term Goal: To go back home    Interventions:  - Follow medical regimen  - See additional Care Plan goals for specific interventions  Outcome: Completed  Goal: Patient/Family Short Term Goal  Description: Patient's Short Term Goal: To recover from cath     Interventions:   - Cardiac consult  - See additional Care Plan goals for specific interventions  Outcome: Completed     Problem: PAIN - ADULT  Goal: Verbalizes/displays adequate comfort level or patient's stated pain goal  Description: INTERVENTIONS:  - Encourage pt to monitor pain and request assistance  - Assess pain using appropriate pain scale  - Administer analgesics based on type and severity of pain and evaluate response  - Implement non-pharmacological measures as appropriate and evaluate response  - Consider cultural and social influences on pain and pain management  - Manage/alleviate anxiety  - Utilize distraction and/or relaxation techniques  - Monitor for opioid side effects  - Notify MD/LIP if interventions unsuccessful or patient reports new pain  - Anticipate increased pain with activity and pre-medicate as appropriate  Outcome: Completed     Problem: CARDIOVASCULAR - ADULT  Goal: Maintains optimal cardiac output and hemodynamic stability  Description: INTERVENTIONS:  - Monitor vital signs, rhythm, and trends  - Monitor for bleeding, hypotension and signs of decreased cardiac output  - Evaluate effectiveness of vasoactive medications to optimize hemodynamic stability  - Monitor arterial and/or venous puncture sites for bleeding and/or hematoma  - Assess quality of pulses, skin color and temperature  - Assess for signs of decreased coronary artery perfusion - ex.  Angina  - Evaluate fluid balance, assess for edema, trend weights  Outcome: Completed  Goal: Absence of cardiac arrhythmias or at baseline  Description: INTERVENTIONS:  - Continuous cardiac monitoring, monitor vital signs, obtain 12 lead EKG if indicated  - Evaluate effectiveness of antiarrhythmic and heart rate control medications as ordered  - Initiate emergency measures for life threatening arrhythmias  - Monitor electrolytes and administer replacement therapy as ordered  Outcome: Completed     Problem: Patient Centered Care  Goal: Patient preferences are identified and integrated in the patient's plan of care  Description: Interventions:  - What would you like us to know as we care for you?   - Provide timely, complete, and accurate information to patient/family  - Incorporate patient and family knowledge, values, beliefs, and cultural backgrounds into the planning and delivery of care  - Encourage patient/family to participate in care and decision-making at the level they choose  - Honor patient and family perspectives and choices  Outcome: Completed

## 2023-06-09 ENCOUNTER — ORDER TRANSCRIPTION (OUTPATIENT)
Dept: CARDIAC REHAB | Facility: HOSPITAL | Age: 58
End: 2023-06-09

## 2023-06-09 DIAGNOSIS — Z98.61 S/P PTCA (PERCUTANEOUS TRANSLUMINAL CORONARY ANGIOPLASTY): Primary | ICD-10-CM

## 2023-06-19 ENCOUNTER — APPOINTMENT (OUTPATIENT)
Dept: CARDIAC REHAB | Facility: HOSPITAL | Age: 58
End: 2023-06-19
Attending: INTERNAL MEDICINE
Payer: COMMERCIAL

## 2023-11-21 DIAGNOSIS — E11.65 TYPE 2 DIABETES MELLITUS WITH HYPERGLYCEMIA (HCC): Primary | ICD-10-CM

## 2023-11-21 DIAGNOSIS — Z79.4 LONG TERM CURRENT USE OF INSULIN (HCC): ICD-10-CM

## 2023-12-04 ENCOUNTER — OFFICE VISIT (OUTPATIENT)
Dept: ENDOCRINOLOGY CLINIC | Facility: CLINIC | Age: 58
End: 2023-12-04

## 2023-12-04 VITALS
BODY MASS INDEX: 32 KG/M2 | SYSTOLIC BLOOD PRESSURE: 125 MMHG | DIASTOLIC BLOOD PRESSURE: 73 MMHG | WEIGHT: 202 LBS | HEART RATE: 63 BPM

## 2023-12-04 DIAGNOSIS — Z79.4 TYPE 2 DIABETES MELLITUS WITH HYPERGLYCEMIA, WITH LONG-TERM CURRENT USE OF INSULIN (HCC): Primary | ICD-10-CM

## 2023-12-04 DIAGNOSIS — I25.10 CORONARY ARTERY DISEASE INVOLVING NATIVE CORONARY ARTERY OF NATIVE HEART, UNSPECIFIED WHETHER ANGINA PRESENT: ICD-10-CM

## 2023-12-04 DIAGNOSIS — Z86.39: ICD-10-CM

## 2023-12-04 DIAGNOSIS — R79.89 LOW VITAMIN D LEVEL: ICD-10-CM

## 2023-12-04 DIAGNOSIS — E78.2 MIXED HYPERLIPIDEMIA: ICD-10-CM

## 2023-12-04 DIAGNOSIS — E11.59 HYPERTENSION ASSOCIATED WITH DIABETES: ICD-10-CM

## 2023-12-04 DIAGNOSIS — E11.42 DIABETIC PERIPHERAL NEUROPATHY (HCC): ICD-10-CM

## 2023-12-04 DIAGNOSIS — E11.65 TYPE 2 DIABETES MELLITUS WITH HYPERGLYCEMIA, WITH LONG-TERM CURRENT USE OF INSULIN (HCC): Primary | ICD-10-CM

## 2023-12-04 DIAGNOSIS — I15.2 HYPERTENSION ASSOCIATED WITH DIABETES: ICD-10-CM

## 2023-12-04 DIAGNOSIS — E78.00 HYPERCHOLESTEROLEMIA: ICD-10-CM

## 2023-12-04 DIAGNOSIS — E11.21 DIABETIC NEPHROPATHY ASSOCIATED WITH TYPE 2 DIABETES MELLITUS (HCC): ICD-10-CM

## 2023-12-04 LAB
CARTRIDGE EXPIRATION DATE: ABNORMAL DATE
CARTRIDGE LOT#: ABNORMAL NUMERIC
GLUCOSE BLOOD: 240
HEMOGLOBIN A1C: 9.3 % (ref 4.3–5.6)
TEST STRIP EXPIRATION DATE: NORMAL DATE
TEST STRIP LOT #: NORMAL NUMERIC

## 2023-12-04 PROCEDURE — 99205 OFFICE O/P NEW HI 60 MIN: CPT | Performed by: INTERNAL MEDICINE

## 2023-12-04 PROCEDURE — 3046F HEMOGLOBIN A1C LEVEL >9.0%: CPT | Performed by: INTERNAL MEDICINE

## 2023-12-04 PROCEDURE — 3078F DIAST BP <80 MM HG: CPT | Performed by: INTERNAL MEDICINE

## 2023-12-04 PROCEDURE — 82947 ASSAY GLUCOSE BLOOD QUANT: CPT | Performed by: INTERNAL MEDICINE

## 2023-12-04 PROCEDURE — 3074F SYST BP LT 130 MM HG: CPT | Performed by: INTERNAL MEDICINE

## 2023-12-04 PROCEDURE — 83036 HEMOGLOBIN GLYCOSYLATED A1C: CPT | Performed by: INTERNAL MEDICINE

## 2023-12-04 RX ORDER — INSULIN LISPRO 100 [IU]/ML
INJECTION, SOLUTION INTRAVENOUS; SUBCUTANEOUS
COMMUNITY

## 2023-12-04 RX ORDER — BLOOD-GLUCOSE,RECEIVER,CONT
1 EACH MISCELLANEOUS ONCE
Qty: 1 EACH | Refills: 0 | Status: SHIPPED | OUTPATIENT
Start: 2023-12-04 | End: 2023-12-04

## 2023-12-04 RX ORDER — BLOOD-GLUCOSE SENSOR
1 EACH MISCELLANEOUS
Qty: 6 EACH | Refills: 1 | Status: SHIPPED | OUTPATIENT
Start: 2023-12-04 | End: 2024-03-03

## 2023-12-04 RX ORDER — INSULIN GLARGINE-YFGN 100 [IU]/ML
50 INJECTION, SOLUTION SUBCUTANEOUS DAILY
COMMUNITY
Start: 2023-08-25

## 2023-12-04 RX ORDER — TIRZEPATIDE 2.5 MG/.5ML
2.5 INJECTION, SOLUTION SUBCUTANEOUS
Qty: 2 ML | Refills: 3 | Status: SHIPPED | OUTPATIENT
Start: 2023-12-04

## 2023-12-04 NOTE — PATIENT INSTRUCTIONS
Uncontrolled complicated DM w/ micro and macrovascular complications  S/p  CAD LAD PCI    On insulin, statin, losartan     Eye exam 6/2023  Foot exam 2022 at PCP office - will refer to podiatry     Refer to CDE - has an appt on Monday   Glargine 44 units at night   Humalog 12 units with large meals and 6 units with smaller meals     If Blood sugar is high, take correction dose insulin using short acting insulin Humalog/Novolog/Fiasp    151 - 175 mg/dL= 1 unit  176 - 200 mg/dL= 2 unit  201 - 225 mg/dL= 3 units  226 - 250 mg/dL= 4 unit  251 - 300 mg/dL= 6 units  301 - 350 mg/dL= 8 units  greater than 350 mg/dL = 10 units     Start mounjaro 2.5 mg weekly and will titrate as tolerated   Will give freestyle samples and send rx   Will think about SGLT2i - Farxiga, Jardiance, invokana  Will consider metformin even a small doses if tolerated     Labs in 3mo   f/u in 2mo

## 2023-12-04 NOTE — PROGRESS NOTES
New Patient Evaluation - History and Physical    CONSULT - Reason for Visit:  DM .  Requesting Physician: Alisa Uribe MD      CHIEF COMPLAINT:    Chief Complaint   Patient presents with    Consult     Consult for diabetes         HISTORY OF PRESENT ILLNESS:   Peyton Rolon is a 62year old female who presents with DM    Saw 2 endocrinology before but was managed by PCP    DM t2 with HTN, DLP, diabetic retinopathy  and CAD s/p PCI mid LAD 95% stenosis with CINTHYA x 1 2023   worsening A1c 9.5  2023 from 8.2 2023      Dx when was pregnant with her daughter ~65. Was dx with DM in   Had GI SE from metformin   Started insulin ~ 10 yrs ago   Was on Lantus, novolog    Tried ozempic in the past x2 but had SE from it. Now takes   Glargine 42 units at night   Humalog  8 to 20 units, before meals   Was started on Januvia recently ~ 10/2023 and BG is better.  this am and she took 18     No hx of MTC or pancreatitis   Yeast/UTI was in . Gets them once a year    . DM quality measures:  A1C/Blood pressure: as reported above   Nephropathy screenin2023 continue ace /arb rx. LIPID screenin2023 . On lipitor statin rx. Last dilated eye exam: No data recorded Exam shows retinopathy?  No data recorded  Last diabetic foot exam: No data recorded  Dentist : recommend every 6m    She is a  - has arthritis in the hands     Seeing GI now   Has periMP Sx now     ASSESSMENT AND PLAN:  Uncontrolled complicated DM w/ micro and macrovascular complications  S/p  CAD LAD PCI    On insulin, statin, losartan   Eye exam 2023  Foot exam  at PCP office - will refer to podiatry     Refer to CDE - has an appt on Monday   Glargine 44 units at night   Humalog 12 units with large meals and 6 units with smaller meals     If Blood sugar is high, take correction dose insulin using short acting insulin Humalog/Novolog/Fiasp    151 - 175 mg/dL= 1 unit  176 - 200 mg/dL= 2 unit  201 - 225 mg/dL= 3 units  226 - 250 mg/dL= 4 unit  251 - 300 mg/dL= 6 units  301 - 350 mg/dL= 8 units  greater than 350 mg/dL = 10 units     Start mounjaro 2.5 mg weekly and will titrate as tolerated   Will give freestyle samples and send rx   Will think about SGLT2i - Farxiga, Jardiance, invokana  Will consider metformin even a small doses if tolerated     Labs in 3mo   f/u in 2mo                   PAST MEDICAL HISTORY:   Past Medical History:   Diagnosis Date    ANEMIA     High blood pressure     History of blood transfusion     HTN (hypertension)     Hypercholesterolemia 2016    Mild nonproliferative diabetic retinopathy without macular edema associated with type 2 diabetes mellitus ([facility] [address] 2016    OTHER DISEASES     uterine fibroids    Type II diabetes mellitus, uncontrolled 03/10/2014    Type II or unspecified type diabetes mellitus without mention of complication, not stated as uncontrolled around        PAST SURGICAL HISTORY:   Past Surgical History:   Procedure Laterality Date          COLONOSCOPY  6/15    hemorrhoids; repeat 10 yrs    COLONOSCOPY,DIAGNOSTIC N/A 6/3/2015    Procedure: COLONOSCOPY, POSSIBLE BIOPSY, POSSIBLE POLYPECTOMY 22861;  Surgeon: Beth Joseph MD;  Location: [address] [place] [address] 2.1-3CM TRUNK,ARM,LEG Left 2016    Procedure: EXCISION OF GROIN MASS;  Surgeon: El Diaz MD;  Location: [address] [place] [address]      done for fibroids ([place]); ovaries left    LAYR CLOS WND TRUNK,ARM,LEG <2.5CM Left 2016    Procedure: EXCISION OF GROIN MASS;  Surgeon: El Diaz MD;  Location: [facility]       CURRENT MEDICATIONS:    Current Outpatient Medications   Medication Sig Dispense Refill    HUMALOG KWIKPEN 100 UNIT/ML Subcutaneous Solution Pen-injector Inject 8-16 Units into the skin 3 (three) times daily with meals.       LA NENA, YFGN, 100 UNIT/ML Subcutaneous Solution Pen-injector Inject 50 Units/day into the skin daily. atorvastatin 40 MG Oral Tab Take 1 tablet (40 mg total) by mouth nightly. 90 tablet 3    losartan 100 MG Oral Tab Take 1 tablet (100 mg total) by mouth every evening. 90 tablet 3    ticagrelor 90 MG Oral Tab Take 1 tablet (90 mg total) by mouth every 12 (twelve) hours. 180 tablet 3    amLODIPine 2.5 MG Oral Tab Take 1 tablet (2.5 mg total) by mouth daily. aspirin 81 MG Oral Tab EC Take 1 tablet (81 mg total) by mouth daily. metoprolol succinate ER 25 MG Oral Tablet 24 Hr Take 1 tablet (25 mg total) by mouth daily. 30 tablet 11    linaCLOtide (LINZESS) 72 MCG Oral Cap Take by mouth at bedtime. phenylephrine-min oil-abe (PREPARATION H) 0.25-14-74.9 % Rectal Ointment Place 1 g rectally 2 (two) times daily as needed for Hemorrhoids. 28 g 0    Glucose Blood (CONTOUR NEXT TEST) In Vitro Strip TEST BLOOD SUGAR THREE TIMES DAILY 300 strip 1    Hydrocortisone (ANUCORT-HC) 25 MG Rectal Suppos Place 1 suppository (25 mg total) rectally at bedtime. 30 suppository 0    Insulin Pen Needle (BD PEN NEEDLE ANASTASIA U/F) 32G X 4 MM Does not apply Misc 1 strip by In Vitro route 4 (four) times daily.  400 each 1    ACCU-CHEK ERIS PLUS In Vitro Strip USE TO TEST BLOOD SUGAR THREE TIMES DAILY AS DIRECTED 300 strip 3    BD PEN NEEDLE ANASTASIA U/F 32G X 4 MM Does not apply Misc Use twice daily with Lantus & Novolog insulin pens 100 each 6    Lancets (ASCENSIA MICROLET) Does not apply Misc Test 3 times daily 100 Each 11       ALLERGIES:  No Known Allergies    SOCIAL HISTORY:    Social History     Socioeconomic History    Marital status:    Occupational History    Occupation: cosmotologist   Tobacco Use    Smoking status: Never    Smokeless tobacco: Never   Vaping Use    Vaping Use: Never used   Substance and Sexual Activity    Alcohol use: Not Currently    Drug use: No    Sexual activity: Yes     Partners: Male     Birth control/protection: Hysterectomy       FAMILY HISTORY:   Family History   Problem Relation Age of Onset    Diabetes Maternal Grandmother     Eye Problems Maternal Grandmother         cataracts/iol    Diabetes Other         aunt    Breast Cancer Maternal Aunt 61        age 61      Daughter with t2DM seeing Dr Sebastián Pantoja:  Hot flashes   Frequent urination   Pressure abdomen, superior. Hand join pain  All negative other than HPI      PHYSICAL EXAM:   Height: --  Weight: 202 lb (91.6 kg) (12/04 1037)  BSA (Calculated - sq m): --  Pulse: --  BP: --  Temp: --  Do Not Use - Resp Rate: --  SpO2: --      CONSTITUTIONAL:  Awake and alert. Age appropriate, good hygiene not in acute distress. Well nourished and well developed. no acute distress   PSYCH:   Orientated to time, place, person & situation, Normal mood and affect, memory intact, normal insight and judgment, cooperative  Neuro: speech is clear. Awake, alert, no aphasia, no facial asymmetry, no nuchal rigidity  EYES:  No proptosis, no ptosis, conjunctiva normal  ENT:  Normocephalic, atraumatic  Eye: EOMI, normal lids, no discharge, no conjunctival erythema. No exophthalmos/proptosis, Ptosis negative   No rhinorrhea, moist oral mucosa  Neck: full range of motion  Neck/Thyroid: neck inspection: normal, No scar, No goiter   LUNGS:  No acute respiratory distress, non-labored respiration. Speaking full sentences  CARDIOVASCULAR:  regular rate   ABDOMEN:  No abdominal pain. obese  SKIN:  no bruising or bleeding, no rashes and no lesions, Skin is dry, no obvious rashes or lesions  EXTREMITIES: no gross abnormality   MSK: Moves extremities spontaneously.  full range of motion in all major joints      DATA:     Pertinent data reviewed    Orders Placed This Encounter   Procedures    POC HemoCue Glucose 201 (Finger stick glucose)    POC Glycohemoglobin [66613]       12/4/2023  Jaycee Umanzor MD

## 2023-12-10 ENCOUNTER — PATIENT MESSAGE (OUTPATIENT)
Dept: ENDOCRINOLOGY CLINIC | Facility: CLINIC | Age: 58
End: 2023-12-10

## 2023-12-10 ENCOUNTER — TELEPHONE (OUTPATIENT)
Dept: ENDOCRINOLOGY | Facility: HOSPITAL | Age: 58
End: 2023-12-10

## 2023-12-11 ENCOUNTER — APPOINTMENT (OUTPATIENT)
Dept: ENDOCRINOLOGY | Facility: HOSPITAL | Age: 58
End: 2023-12-11
Attending: INTERNAL MEDICINE
Payer: COMMERCIAL

## 2023-12-11 ENCOUNTER — TELEPHONE (OUTPATIENT)
Dept: ENDOCRINOLOGY CLINIC | Facility: CLINIC | Age: 58
End: 2023-12-11

## 2023-12-11 NOTE — TELEPHONE ENCOUNTER
Received fax regarding PA for Mounjaro    Medication PA Requested:   Tirzepatide (MOUNJARO) 2.5 MG/0.5ML Subcutaneous Solution Pen-injector                                                        CoverMyMeds Used:  Key: [id number]  Quantity: 2 mL   Day Supply: 30 DAYS   Sig:  Inject 2.5 mg into the skin every 7 days.   DX Code: E11.65, Z79.4                                     CPT code (if applicable):   Case Number/Pending Ref#:

## 2023-12-12 NOTE — TELEPHONE ENCOUNTER
Problem: Venous Thromboembolism (VTW)/Deep Vein Thrombosis (DVT) Prevention:  Goal: Patient will participate in Venous Thrombosis (VTE)/Deep Vein Thrombosis (DVT)Prevention Measures  Outcome: PROGRESSING AS EXPECTED  Note: Pt receiving lovenox injection every 12hrs for DVT prophylaxis. Will continue to monitor.     Problem: Bowel/Gastric:  Goal: Normal bowel function is maintained or improved  Outcome: PROGRESSING AS EXPECTED  Note: Pt continent of bowel. She refused her scheduled senna tonight. LBM 10/12/20.      From: [place]  To: Brad Bella  Sent: 12/10/2023 2:12 PM CST  Subject: Kadi Velasquez    My insurance isn't going to fill it. What's my next step?

## 2023-12-12 NOTE — TELEPHONE ENCOUNTER
Per 12/11/23 VANDANA PA was submitted today. RN called [employer] [phone]  Per Hilario Avila was approved from 12/12/23-12/11/24. Case # [id number].   Pt was made aware of the approval.

## 2023-12-14 ENCOUNTER — PATIENT MESSAGE (OUTPATIENT)
Dept: ADMINISTRATIVE | Age: 58
End: 2023-12-14

## 2023-12-14 DIAGNOSIS — E11.65 TYPE 2 DIABETES MELLITUS WITH HYPERGLYCEMIA, WITH LONG-TERM CURRENT USE OF INSULIN (HCC): Primary | ICD-10-CM

## 2023-12-14 DIAGNOSIS — Z79.4 TYPE 2 DIABETES MELLITUS WITH HYPERGLYCEMIA, WITH LONG-TERM CURRENT USE OF INSULIN (HCC): Primary | ICD-10-CM

## 2023-12-18 ENCOUNTER — HOSPITAL ENCOUNTER (OUTPATIENT)
Dept: ENDOCRINOLOGY | Facility: HOSPITAL | Age: 58
End: 2023-12-18
Attending: INTERNAL MEDICINE
Payer: COMMERCIAL

## 2023-12-18 ENCOUNTER — TELEPHONE (OUTPATIENT)
Dept: ENDOCRINOLOGY | Facility: HOSPITAL | Age: 58
End: 2023-12-18

## 2023-12-18 DIAGNOSIS — Z79.4 TYPE 2 DIABETES MELLITUS WITH HYPERGLYCEMIA, WITH LONG-TERM CURRENT USE OF INSULIN (HCC): Primary | ICD-10-CM

## 2023-12-18 DIAGNOSIS — E11.65 TYPE 2 DIABETES MELLITUS WITH HYPERGLYCEMIA, WITH LONG-TERM CURRENT USE OF INSULIN (HCC): Primary | ICD-10-CM

## 2023-12-18 NOTE — TELEPHONE ENCOUNTER
Can you please sign attached pended order for patient's appts in the Diabetes [facility] [address] [facility]? Thank you!

## 2023-12-27 ENCOUNTER — PATIENT MESSAGE (OUTPATIENT)
Dept: ENDOCRINOLOGY CLINIC | Facility: CLINIC | Age: 58
End: 2023-12-27

## 2023-12-27 NOTE — TELEPHONE ENCOUNTER
From: Sanford Munroe  To: Trena Contreras  Sent: 12/27/2023 1:30 PM CST  Subject: Pau Brasher     I forgot to take the mounjaro on Monday should I take it this week or take it this Monday?

## 2024-01-15 ENCOUNTER — HOSPITAL ENCOUNTER (OUTPATIENT)
Dept: ENDOCRINOLOGY | Facility: HOSPITAL | Age: 59
End: 2024-01-15
Attending: INTERNAL MEDICINE
Payer: COMMERCIAL

## 2024-01-15 DIAGNOSIS — Z79.4 TYPE 2 DIABETES MELLITUS WITH HYPERGLYCEMIA, WITH LONG-TERM CURRENT USE OF INSULIN (HCC): Primary | ICD-10-CM

## 2024-01-15 DIAGNOSIS — E11.65 TYPE 2 DIABETES MELLITUS WITH HYPERGLYCEMIA, WITH LONG-TERM CURRENT USE OF INSULIN (HCC): Primary | ICD-10-CM

## 2024-01-15 NOTE — PROGRESS NOTES
Emi Montejo 7/3/1965 attended for Intensive Individual Management:    Date: 1/15/2024 Start Time: 1015A End Time: 11A    HEMOGLOBIN A1C (%)   Date Value   12/04/2023 9.3 (A)     Wt Readings from Last 1 Encounters:   12/04/23 202 lb     Declined weight today, reports 5# down     Assessment:  Diagnosis around '95-96  Daughter with DM on insulin and CGM, likely Tandem pump  , has Mondays off  Does report she is eating better and lost 5#  Walking 3x per week  No logs today   Did not like Omnipod/something sticking to her  Still complaints of constipation, unsure if Mounjaro has worsened this or not  Frustrated with coverage changes for medication    Declined learning carb counting, stated she thinks she's doing better - discussed importance of match for I:C and also, flexibility of in I:C ratio   Declined using correction scale, reports she guesstimates if sugar is higher    Diet Recall: (am) belvita x 2 cookies, oatmeal, coffee (lunch) chicken soup and roll (dinner) filet, baked potato, green beans (snack) 1/2 banana    Walking 2-3 miles, 3x per week with girlfriend      Education:     Diabetes Overview  Reviewed diabetes pathophysiology, current HgbA1C, and target BG levels.  Discussed benefits of BG control and self-management options.    Healthy Eating  Reviewed and reinforced macronutrients, carbohydrate counting, and meal planning.   Discussed basic nutrition for diabetes management.  Instructed on carbohydrate counting, meal planning and food label reading.  Taught adjustments for high fiber foods, sugar alcohols and effects of fat and alcohol.    Being Active  Currently exercising: yes  Reinforced the benefits of exercise and safety precautions.  Encouraged Physical Activity and briefly reviewed ADA recommended guidelines for activity.  Instructed on food and/or insulin adjustments for exercise.    Monitoring  Benefits and options for glucose monitoring, target BG goals, HgbA1C values.  Reinforced  glucose monitoring schedules.  Currently testing/monitoring BG  Instructed on record keeping and provided intensive management records for food, BG and insulin - reports she will bring this to next visit    Taking Medication  Instructed on timing and action of diabetes medication.  Reviewed pen technique.  Reviewed site selection, site rotation, insulin storage and sharps disposal.  Reviewed basal/bolus concept.  Instructed on basics of insulin pump therapy as method of insulin delivery.  Instructed on various insulin pumps on the market.      Reducing Risk  Instructed on food and/or insulin adjustments for exercise.      Problem Solving:  Discussed signs, symptoms and treatment of hypoglycemia and hyperglycemia.  Reviewed management of hyperglycemia and hypoglycemia  Taught Rule of 15  Discussed calling provider and/or diabetes learning center with blood sugars less than 70 twice in one week or blood sugars consistently elevated above 250.     Health Coping  Family involvement/support encouraged  Depression and diabetes reviewed.    Diabetes Support Management Plan provided and reviewed with patient.   Emi Nikia has chosen the following ongoing Diabetes Support Plan: Support Groups at [place], Journals such as Diabetes Forecast or Self-Management, and The use of Diabetes Websites or Apps    Patient Goals/Recommendations: Patient chosen goals included in patient's instructions for today.    Pt verbalized understanding and has no further questions. Reports she is making as many changes as she can right now and hopes she will not have to put more effort into her DM management    Follow up appointment set for: TBD, if A1c is above 8%, pt is encouraged to come back  Breanna Gómez RD

## 2024-01-16 ENCOUNTER — TELEPHONE (OUTPATIENT)
Dept: ENDOCRINOLOGY CLINIC | Facility: CLINIC | Age: 59
End: 2024-01-16

## 2024-01-16 DIAGNOSIS — Z79.4 TYPE 2 DIABETES MELLITUS WITH HYPERGLYCEMIA, WITH LONG-TERM CURRENT USE OF INSULIN (HCC): Primary | ICD-10-CM

## 2024-01-16 DIAGNOSIS — E11.65 TYPE 2 DIABETES MELLITUS WITH HYPERGLYCEMIA, WITH LONG-TERM CURRENT USE OF INSULIN (HCC): Primary | ICD-10-CM

## 2024-01-16 RX ORDER — TIRZEPATIDE 5 MG/.5ML
5 INJECTION, SOLUTION SUBCUTANEOUS
Qty: 2 ML | Refills: 0 | Status: SHIPPED | OUTPATIENT
Start: 2024-01-16 | End: 2024-02-13

## 2024-01-16 NOTE — TELEPHONE ENCOUNTER
Tirzepatide (MOUNJARO) 2.5 MG/0.5ML Subcutaneous Solution Pen-injector, Inject 2.5 mg into the skin every 7 days., Disp: 2 mL, Rfl: 3       no

## 2024-01-16 NOTE — TELEPHONE ENCOUNTER
Dr. Contreras, would you like to increase dose of Mounjaro? She has been on Mounjaro 2.5mg since starting in the beginning of December.

## 2024-01-16 NOTE — TELEPHONE ENCOUNTER
Per [employer]: \"An active PA is already on file with expiration date of 12/11/2024.\"    Spoke with [facility], it is still showing as Mounjaro PA needed on their end.     They have a different BIN and PCN number.   BIN: [id number], PCN [id number]    Spoke with patient to verify her pharmacy benefits. She states the card we have on file from September is correct. Nothing changed since then.    Spoke with pharmacy again, they state all of the pharmacy benefits state PA is needed.     Completed [place] PA in CoverMeds and received this response: \"An active PA is already on file with expiration date of 12/11/2024.\"    Spoke with Ana. They were able to override and change to max daily dose of 4 pens per month. Should be going through with pharmacy now.     Reviewed Dr. Contreras's last note, per note, titrate Mounjaro dose up as tolerated. Mounjaro 5mg sent to pharmcy.     Spoke with pharmacy and Mounjaro 5mg is going throguh insurance with $25 copay.     Left detailed message regarding this for patient.     Over 1 hour spent on this encounter.

## 2024-01-16 NOTE — TELEPHONE ENCOUNTER
Pt states that this RX will need prior auth because her insurance changed.  Insurance information in chart and verified.  Please call.      Pt states she needs this for her injection tomorrow.

## 2024-02-12 ENCOUNTER — OFFICE VISIT (OUTPATIENT)
Dept: ENDOCRINOLOGY CLINIC | Facility: CLINIC | Age: 59
End: 2024-02-12
Payer: COMMERCIAL

## 2024-02-12 VITALS
SYSTOLIC BLOOD PRESSURE: 138 MMHG | WEIGHT: 196 LBS | HEIGHT: 67 IN | DIASTOLIC BLOOD PRESSURE: 83 MMHG | HEART RATE: 73 BPM | BODY MASS INDEX: 30.76 KG/M2

## 2024-02-12 DIAGNOSIS — E78.2 MIXED HYPERLIPIDEMIA: ICD-10-CM

## 2024-02-12 DIAGNOSIS — E11.42 DIABETIC PERIPHERAL NEUROPATHY (HCC): ICD-10-CM

## 2024-02-12 DIAGNOSIS — I25.10 CORONARY ARTERY DISEASE INVOLVING NATIVE CORONARY ARTERY OF NATIVE HEART, UNSPECIFIED WHETHER ANGINA PRESENT: ICD-10-CM

## 2024-02-12 DIAGNOSIS — R79.89 LOW VITAMIN D LEVEL: ICD-10-CM

## 2024-02-12 DIAGNOSIS — I15.2 HYPERTENSION ASSOCIATED WITH DIABETES  (HCC): ICD-10-CM

## 2024-02-12 DIAGNOSIS — Z79.4 TYPE 2 DIABETES MELLITUS WITH HYPERGLYCEMIA, WITH LONG-TERM CURRENT USE OF INSULIN (HCC): Primary | ICD-10-CM

## 2024-02-12 DIAGNOSIS — E78.00 HYPERCHOLESTEROLEMIA: ICD-10-CM

## 2024-02-12 DIAGNOSIS — Z86.39: ICD-10-CM

## 2024-02-12 DIAGNOSIS — E11.65 TYPE 2 DIABETES MELLITUS WITH HYPERGLYCEMIA, WITH LONG-TERM CURRENT USE OF INSULIN (HCC): Primary | ICD-10-CM

## 2024-02-12 DIAGNOSIS — E11.59 HYPERTENSION ASSOCIATED WITH DIABETES  (HCC): ICD-10-CM

## 2024-02-12 DIAGNOSIS — E11.21 DIABETIC NEPHROPATHY ASSOCIATED WITH TYPE 2 DIABETES MELLITUS (HCC): ICD-10-CM

## 2024-02-12 LAB
GLUCOSE BLOOD: 367
TEST STRIP EXPIRATION DATE: NORMAL DATE
TEST STRIP LOT #: NORMAL NUMERIC

## 2024-02-12 PROCEDURE — 82947 ASSAY GLUCOSE BLOOD QUANT: CPT | Performed by: INTERNAL MEDICINE

## 2024-02-12 PROCEDURE — 99214 OFFICE O/P EST MOD 30 MIN: CPT | Performed by: INTERNAL MEDICINE

## 2024-02-12 NOTE — PROGRESS NOTES
Follow up Visit:  DM .  Requesting Physician:   .Talia Pierce MD      CHIEF COMPLAINT:    Chief Complaint   Patient presents with    Diabetes     Check diabetes         HISTORY OF PRESENT ILLNESS:   Emi Montejo is a 58 year old female who presents with DM    Saw 2 endocrinology before but was managed by PCP    DM t2 with HTN, DLP, diabetic retinopathy  and CAD s/p PCI mid LAD 95% stenosis with CINTHYA x 1 2023   worsening A1c 9.5  2023 from 8.2 2024  - had waffle and prunes took 16 units humalog   Does not like medications and prefers to take less   Cannot tolerate mounjaro   Seeing GI now     Dx when was pregnant with her daughter ~. Was dx with DM in   Had GI SE from metformin   Started insulin ~ 10 yrs ago   Was on Lantus, novolog    Tried ozempic in the past x2 but had SE from it.  Also had GI SE from mounjaro even low doses   Now takes   Glargine 40 to 45 units at night   Humalog  10 to 16 units, before meals   Was started on Januvia recently ~ 10/2023 and BG is better.  But had SE From it also     No hx of MTC or pancreatitis   Yeast/UTI was in . Gets them once a year    .DM quality measures:  A1C/Blood pressure: as reported above   Nephropathy screenin2023 continue ace /arb rx.   LIPID screenin2023 . On lipitor statin rx.   Last dilated eye exam: Last Dilated Eye Exam:  ()   Exam shows retinopathy? No data recorded  Last diabetic foot exam: Last Foot Exam:  (Yes)    Dentist : recommend every 6m    She is a  - has arthritis in the hands     Has periMP Sx now       ASSESSMENT AND PLAN:  Uncontrolled complicated DM w/ micro and macrovascular complications  S/p  CAD LAD PCI    On insulin, statin, losartan   Eye exam 2023  Foot exam  at PCP office - will refer to podiatry     Did not tolerate other meds ( MTF, GLP_1, GIP/GLP, DPP4i)   Will avoid mounjaro d/t GI SE  Does not want Freestyle  - cannot afford, prefers less complicated  plan and does not mind fingerstick  Will avoid adding more meds now d/t GI side effect - will work with GI now     plan    Glargine 45 units at night   Humalog   12 units with regular meals   18 units with  larger meals    6 units with smaller meals.     If Blood sugar is high, take correction dose insulin using short acting insulin Humalog/Novolog/Fiasp    151 - 175 mg/dL= 1 unit  176 - 200 mg/dL= 2 unit  201 - 225 mg/dL= 3 units  226 - 250 mg/dL= 4 unit  251 - 300 mg/dL= 6 units  301 - 350 mg/dL= 8 units  greater than 350 mg/dL = 10 units    f/u in 1 mo                     PAST MEDICAL HISTORY:   Past Medical History:   Diagnosis Date    ANEMIA     High blood pressure     History of blood transfusion     HTN (hypertension)     Hypercholesterolemia 2016    Mild nonproliferative diabetic retinopathy without macular edema associated with type 2 diabetes mellitus (HCC) 2016    OTHER DISEASES     uterine fibroids    Type II diabetes mellitus, uncontrolled 03/10/2014    Type II or unspecified type diabetes mellitus without mention of complication, not stated as uncontrolled around        PAST SURGICAL HISTORY:   Past Surgical History:   Procedure Laterality Date          COLONOSCOPY  6/15    hemorrhoids; repeat 10 yrs    COLONOSCOPY,DIAGNOSTIC N/A 6/3/2015    Procedure: COLONOSCOPY, POSSIBLE BIOPSY, POSSIBLE POLYPECTOMY 44033;  Surgeon: Carlo Sullivan MD;  Location: [facility]    D & C      EXC SKIN BENIG 2.1-3CM TRUNK,ARM,LEG Left 2016    Procedure: EXCISION OF GROIN MASS;  Surgeon: George Mao MD;  Location: [facility]    HYSTERECTOMY      done for fibroids ([place]); ovaries left    LAYR CLOS WND TRUNK,ARM,LEG <2.5CM Left 2016    Procedure: EXCISION OF GROIN MASS;  Surgeon: George Mao MD;  Location: [facility]       CURRENT MEDICATIONS:    Current Outpatient Medications   Medication Sig Dispense Refill    HUMALOG KWIKPEN 100  UNIT/ML Subcutaneous Solution Pen-injector Inject 8-16 Units into the skin 3 (three) times daily with meals.      SEMGLEE, YFGN, 100 UNIT/ML Subcutaneous Solution Pen-injector Inject 50 Units/day into the skin daily.      Continuous Blood Gluc Sensor (FREESTYLE MESFIN 3 SENSOR) Does not apply Misc 1 each every 7 days. 6 each 1    atorvastatin 40 MG Oral Tab Take 1 tablet (40 mg total) by mouth nightly. 90 tablet 3    losartan 100 MG Oral Tab Take 1 tablet (100 mg total) by mouth every evening. 90 tablet 3    ticagrelor 90 MG Oral Tab Take 1 tablet (90 mg total) by mouth every 12 (twelve) hours. 180 tablet 3    amLODIPine 2.5 MG Oral Tab Take 1 tablet (2.5 mg total) by mouth daily.      aspirin 81 MG Oral Tab EC Take 1 tablet (81 mg total) by mouth daily.      metoprolol succinate ER 25 MG Oral Tablet 24 Hr Take 1 tablet (25 mg total) by mouth daily. 30 tablet 11    linaCLOtide (LINZESS) 72 MCG Oral Cap Take by mouth at bedtime.      phenylephrine-min oil-abe (PREPARATION H) 0.25-14-74.9 % Rectal Ointment Place 1 g rectally 2 (two) times daily as needed for Hemorrhoids. 28 g 0    Glucose Blood (CONTOUR NEXT TEST) In Vitro Strip TEST BLOOD SUGAR THREE TIMES DAILY 300 strip 1    Hydrocortisone (ANUCORT-HC) 25 MG Rectal Suppos Place 1 suppository (25 mg total) rectally at bedtime. 30 suppository 0    Insulin Pen Needle (BD PEN NEEDLE ANASTASIA U/F) 32G X 4 MM Does not apply Misc 1 strip by In Vitro route 4 (four) times daily. 400 each 1    ACCU-CHEK ERIS PLUS In Vitro Strip USE TO TEST BLOOD SUGAR THREE TIMES DAILY AS DIRECTED 300 strip 3    BD PEN NEEDLE ANASTASIA U/F 32G X 4 MM Does not apply Misc Use twice daily with Lantus & Novolog insulin pens 100 each 6    Lancets (ASCENSIA MICROLET) Does not apply Misc Test 3 times daily 100 Each 11    Tirzepatide (MOUNJARO) 5 MG/0.5ML Subcutaneous Solution Pen-injector Inject 5 mg into the skin every 7 days for 28 days. (Patient not taking: Reported on 2/12/2024) 2 mL 0        ALLERGIES:  No Known Allergies    SOCIAL HISTORY:    Social History     Socioeconomic History    Marital status:    Occupational History    Occupation: cosmotologist   Tobacco Use    Smoking status: Never    Smokeless tobacco: Never   Vaping Use    Vaping Use: Never used   Substance and Sexual Activity    Alcohol use: Not Currently    Drug use: No    Sexual activity: Yes     Partners: Male     Birth control/protection: Hysterectomy       FAMILY HISTORY:   Family History   Problem Relation Age of Onset    Diabetes Maternal Grandmother     Eye Problems Maternal Grandmother         cataracts/iol    Diabetes Other         aunt    Breast Cancer Maternal Aunt 60        age 60      Daughter with t2DM seeing Dr Ralph             PHYSICAL EXAM:   Height: 5' 7\" (170.2 cm) (02/12 1040)  Weight: 196 lb (88.9 kg) (02/12 1040)  BSA (Calculated - sq m): 2 sq meters (02/12 1040)  Pulse: 73 (02/12 1040)  BP: 138/83 (02/12 1040)  Temp: --  Do Not Use - Resp Rate: --  SpO2: --      CONSTITUTIONAL:  Awake and alert. Age appropriate, good hygiene not in acute distress. Well nourished and well developed. no acute distress   PSYCH:   Orientated to time, place, person & situation, Normal mood and affect, memory intact, normal insight and judgment, cooperative  Neuro: speech is clear. Awake, alert, no aphasia, no facial asymmetry, no nuchal rigidity  EYES:  No proptosis, no ptosis, conjunctiva normal  ENT:  Normocephalic, atraumatic  Eye: EOMI, normal lids, no discharge, no conjunctival erythema. No exophthalmos/proptosis, Ptosis negative   No rhinorrhea, moist oral mucosa  Neck: full range of motion  Neck/Thyroid: neck inspection: normal, No scar, No goiter   LUNGS:  No acute respiratory distress, non-labored respiration. Speaking full sentences    SKIN:  no bruising or bleeding, no rashes and no lesions, Skin is dry, no obvious rashes or lesions  EXTREMITIES: no gross abnormality   MSK: Moves extremities spontaneously.  full range of motion in all major joints      DATA:     Pertinent data reviewed    Orders Placed This Encounter   Procedures    POC HemoCue Glucose 201 (Finger stick glucose)       Trena Contreras MD

## 2024-02-12 NOTE — PATIENT INSTRUCTIONS
Glargine 45 units at night   Humalog   12 units with regular meals   18 units with  larger meals    6 units with smaller meals.     If Blood sugar is high, take correction dose insulin using short acting insulin Humalog/Novolog/Fiasp    151 - 175 mg/dL= 1 unit  176 - 200 mg/dL= 2 unit  201 - 225 mg/dL= 3 units  226 - 250 mg/dL= 4 unit  251 - 300 mg/dL= 6 units  301 - 350 mg/dL= 8 units  greater than 350 mg/dL = 10 units    Will avoid mounjaro d/t GI SE  Will avoid adding more meds now d/t GI side effect - will work with GI now     f/u in 1 mo

## 2024-05-29 RX ORDER — INSULIN LISPRO 100 [IU]/ML
INJECTION, SOLUTION INTRAVENOUS; SUBCUTANEOUS
Qty: 45 ML | Refills: 0 | Status: SHIPPED | OUTPATIENT
Start: 2024-05-29

## 2024-05-29 NOTE — TELEPHONE ENCOUNTER
Endocrine refill protocol for basal insulins     Protocol Criteria:  - Appointment with Endocrinology completed in the last 6 months or scheduled in the next 3 months    - A1c result completed in the last 6 months and is <8.5%     Verify appointment has been completed or scheduled in the appropriate timeline. If so can send a 90 day supply with 1 refill per provider protocol.    Verify A1c has been completed within the last 6 months and is below 8.5%     Last completed office visit:2/12/2024   Next scheduled Follow up: No future appointments.   Last A1c result: Last A1c value was 9.3% done 12/4/2023.

## 2024-07-10 ENCOUNTER — TELEPHONE (OUTPATIENT)
Dept: ENDOCRINOLOGY CLINIC | Facility: CLINIC | Age: 59
End: 2024-07-10

## 2024-07-10 NOTE — TELEPHONE ENCOUNTER
Patient calling states will be receiving new insurance through employer next week and Humalog is not covered. Please call and advise. States able to leave detailed message on cell.

## 2024-07-15 NOTE — TELEPHONE ENCOUNTER
Pt stopped at desk-   Pt is concerned about Humalog not being covered- see her ivana msg also 7/11    Pl call pt

## 2024-08-05 RX ORDER — INSULIN LISPRO 100 [IU]/ML
INJECTION, SOLUTION INTRAVENOUS; SUBCUTANEOUS
Qty: 45 ML | Refills: 1 | Status: SHIPPED | OUTPATIENT
Start: 2024-08-05

## 2024-08-05 NOTE — TELEPHONE ENCOUNTER
Endocrine refill protocol for rapid acting, regular, intermediate, and mixed insulin:    Protocol Criteria:pass  Appointment with Endocrinology completed in the last 6 months or scheduled in the next 3 months     Verify appointment has been completed or scheduled in the appropriate timeline. If so can send a 90 day supply with 1 refill.   Verify A1C has been completed in the last 6 months and is below 8.5%    -May substitute prescriptions for Novolog and Humalog unless documented allergy (pens and vials) at the same dose and concentration per insurance preference and provider protocol.   -May substitute prescriptions for Novolin R and Humulin R unless documented allergy (pens and vials) at the same dose and concentration per insurance preference and provider protocol.   -May substitute prescriptions for Novolin N and Humulin N unless documented allergy (pens and vials) at the same dose and concentration per insurance preference and provider protocol.   -May substitute prescriptions for Humulin and Novolin 70/30 insulin unless documented allergy at the same dose and concentration per insurance preference and provider protocol.    Last completed office visit: 2/12/2024 Trena Contreras MD   Next scheduled Follow up: 09/13/24     Last A1C result: 9.3% done 12/4/2023.

## 2024-08-05 NOTE — TELEPHONE ENCOUNTER
Current Outpatient Medications   Medication Sig Dispense Refill    HUMALOG KWIKPEN 100 UNIT/ML Subcutaneous Solution Pen-injector ADMINISTER 8 TO 16 UNITS UNDER THE SKIN THREE TIMES DAILY WITH MEALS 45 mL 0                        Patient change a pharmacy due insurance , also patient need Humolog and Basaglar to be sent to [facility]  (new pharmacy)Pleasse contact patient.

## 2024-08-07 RX ORDER — INSULIN GLARGINE 100 [IU]/ML
50 INJECTION, SOLUTION SUBCUTANEOUS NIGHTLY
COMMUNITY
Start: 2024-07-26 | End: 2024-08-07

## 2024-08-07 RX ORDER — INSULIN GLARGINE 100 [IU]/ML
45 INJECTION, SOLUTION SUBCUTANEOUS NIGHTLY
Qty: 39 ML | Refills: 0 | Status: SHIPPED | OUTPATIENT
Start: 2024-08-07

## 2024-08-07 NOTE — TELEPHONE ENCOUNTER
Endocrine refill protocol for basal insulins     Protocol Criteria: FAILED - A1c result is above protocol result range.  - Appointment with Endocrinology completed in the last 6 months or scheduled in the next 3 months    - A1c result completed in the last 6 months and is below 8.5%     Verify appointment has been completed or scheduled in the appropriate timeline. If so can send a 90 day supply with 1 refill per provider protocol.    Verify A1c has been completed within the last 6 months and is below 8.5%     Last completed office visit:2/12/2024 Trena Contreras MD   Next scheduled Follow up:   Future Appointments   Date Time Provider Department Center   9/13/2024  3:45 PM Trena Contreras MD [facility]      Last A1c result: Last A1c value was 9.3% done 12/4/2023.

## 2024-08-08 NOTE — TELEPHONE ENCOUNTER
ALISIA Da Silva    Called pt and offered VV. Pt declined stating that it is difficult for her to change appts due to work and would prefer in person visits. Pt states she will keep current appt 9/13

## 2024-08-08 NOTE — TELEPHONE ENCOUNTER
,     Called pt and was notified that she has been taking her insulin differently. Please advise, thanks!    Per LOV 2/2:   Glargine 45 units at night   Humalog   12 units with regular meals   18 units with  larger meals    6 units with smaller meals.        Pt reports taking     Basaglar 40-42 units if BG are not elevated  If elevated, 50 units   She has been taking like this since Feb      Fast BL BD Bed  8/4 92  230  8/5 118  97   8/6 184 166 193 250   8/7 209    Basaglar 40-50 units PM  Humalog 10-16 units, if BG are high will increase to 15-20 TID wth meals     Pt states LVM or send [facility] since she will be working

## 2024-08-27 ENCOUNTER — TELEPHONE (OUTPATIENT)
Dept: ENDOCRINOLOGY CLINIC | Facility: CLINIC | Age: 59
End: 2024-08-27

## 2024-08-27 DIAGNOSIS — Z79.4 TYPE 2 DIABETES MELLITUS WITH HYPERGLYCEMIA, WITH LONG-TERM CURRENT USE OF INSULIN (HCC): Primary | ICD-10-CM

## 2024-08-27 DIAGNOSIS — E11.65 TYPE 2 DIABETES MELLITUS WITH HYPERGLYCEMIA, WITH LONG-TERM CURRENT USE OF INSULIN (HCC): Primary | ICD-10-CM

## 2024-08-28 ENCOUNTER — TELEPHONE (OUTPATIENT)
Dept: ENDOCRINOLOGY CLINIC | Facility: CLINIC | Age: 59
End: 2024-08-28

## 2024-08-28 NOTE — TELEPHONE ENCOUNTER
Glucose Blood (CONTOUR NEXT TEST) In Vitro Strip, TEST BLOOD SUGAR THREE TIMES DAILY, Disp: 300 strip, Rfl: 1    \" Pt requests new Rx\"

## 2024-08-28 NOTE — TELEPHONE ENCOUNTER
LVM to notify patient of refill sent      Passed for strips - Endocrine Refill protocol for Glucose testing supplies     Protocol Criteria: PASSED Reason: N/A  Appointment with Endocrinology completed in the last 12 months or scheduled in the next 6 months     Verify appointment has been completed or scheduled in the appropriate timeline. If so can send a 90 day supply with 1 refill.     Last completed office visit: 2/12/2024 Trena Contreras MD   Next scheduled Follow up:   Future Appointments   Date Time Provider Department Center   9/13/2024  3:45 PM Trena Contreras MD [facility]   10/23/2024  8:20 AM Mary Lynn MD [facility]

## 2024-09-13 ENCOUNTER — OFFICE VISIT (OUTPATIENT)
Facility: CLINIC | Age: 59
End: 2024-09-13
Payer: COMMERCIAL

## 2024-09-13 VITALS
BODY MASS INDEX: 31.55 KG/M2 | HEART RATE: 77 BPM | HEIGHT: 67 IN | WEIGHT: 201 LBS | DIASTOLIC BLOOD PRESSURE: 76 MMHG | SYSTOLIC BLOOD PRESSURE: 131 MMHG

## 2024-09-13 DIAGNOSIS — I25.10 CORONARY ARTERY DISEASE INVOLVING NATIVE CORONARY ARTERY OF NATIVE HEART, UNSPECIFIED WHETHER ANGINA PRESENT: ICD-10-CM

## 2024-09-13 DIAGNOSIS — R73.09 HIGH GLUCOSE LEVEL: ICD-10-CM

## 2024-09-13 DIAGNOSIS — E11.21 DIABETIC NEPHROPATHY ASSOCIATED WITH TYPE 2 DIABETES MELLITUS (HCC): ICD-10-CM

## 2024-09-13 DIAGNOSIS — E78.2 MIXED HYPERLIPIDEMIA: ICD-10-CM

## 2024-09-13 DIAGNOSIS — Z79.4 TYPE 2 DIABETES MELLITUS WITH HYPERGLYCEMIA, WITH LONG-TERM CURRENT USE OF INSULIN (HCC): Primary | ICD-10-CM

## 2024-09-13 DIAGNOSIS — E11.65 TYPE 2 DIABETES MELLITUS WITH HYPERGLYCEMIA, WITH LONG-TERM CURRENT USE OF INSULIN (HCC): Primary | ICD-10-CM

## 2024-09-13 DIAGNOSIS — E11.59 HYPERTENSION ASSOCIATED WITH DIABETES (HCC): ICD-10-CM

## 2024-09-13 DIAGNOSIS — E78.00 HYPERCHOLESTEROLEMIA: ICD-10-CM

## 2024-09-13 DIAGNOSIS — I15.2 HYPERTENSION ASSOCIATED WITH DIABETES (HCC): ICD-10-CM

## 2024-09-13 DIAGNOSIS — E07.9 THYROID DISEASE: ICD-10-CM

## 2024-09-13 DIAGNOSIS — E11.42 DIABETIC PERIPHERAL NEUROPATHY (HCC): ICD-10-CM

## 2024-09-13 LAB
GLUCOSE BLOOD: 211
HEMOGLOBIN A1C: 8.5 % (ref 4.3–5.6)
TEST STRIP LOT #: NORMAL NUMERIC

## 2024-09-13 PROCEDURE — 83036 HEMOGLOBIN GLYCOSYLATED A1C: CPT | Performed by: INTERNAL MEDICINE

## 2024-09-13 PROCEDURE — 82947 ASSAY GLUCOSE BLOOD QUANT: CPT | Performed by: INTERNAL MEDICINE

## 2024-09-13 PROCEDURE — 99214 OFFICE O/P EST MOD 30 MIN: CPT | Performed by: INTERNAL MEDICINE

## 2024-09-13 RX ORDER — BLOOD-GLUCOSE SENSOR
1 EACH MISCELLANEOUS
Qty: 6 EACH | Refills: 0 | Status: SHIPPED | OUTPATIENT
Start: 2024-09-13

## 2024-09-13 RX ORDER — INSULIN GLARGINE 100 [IU]/ML
50 INJECTION, SOLUTION SUBCUTANEOUS NIGHTLY
Qty: 39 ML | Refills: 0 | Status: SHIPPED | OUTPATIENT
Start: 2024-09-13

## 2024-09-13 RX ORDER — ATORVASTATIN CALCIUM 40 MG/1
40 TABLET, FILM COATED ORAL NIGHTLY
Qty: 90 TABLET | Refills: 3 | Status: SHIPPED | OUTPATIENT
Start: 2024-09-13

## 2024-09-13 RX ORDER — BLOOD-GLUCOSE,RECEIVER,CONT
1 EACH MISCELLANEOUS ONCE
Qty: 1 EACH | Refills: 0 | Status: SHIPPED | OUTPATIENT
Start: 2024-09-13 | End: 2024-09-13

## 2024-09-13 RX ORDER — INSULIN LISPRO 100 [IU]/ML
20 INJECTION, SOLUTION INTRAVENOUS; SUBCUTANEOUS
Qty: 15 ML | Refills: 3 | Status: SHIPPED | OUTPATIENT
Start: 2024-09-13

## 2024-09-13 RX ORDER — PEN NEEDLE, DIABETIC 32GX 5/32"
1 NEEDLE, DISPOSABLE MISCELLANEOUS 4 TIMES DAILY
Qty: 400 EACH | Refills: 1 | Status: SHIPPED | OUTPATIENT
Start: 2024-09-13

## 2024-09-13 NOTE — PROGRESS NOTES
Sample continuous glucose monitor, enriqueta 3, placed on patient's arm. Patient understands guidelines and usage of sensor. Patient's sensor connected to clinic and aware of follow up.

## 2024-09-13 NOTE — PATIENT INSTRUCTIONS
Fasting labs   RTC in 3 mo     Target BG   BEFORE MEAL 100-130mg/dl  2hrs AFTER MEAL less than 180mg/dl    Basaglar 45 units at night     Humalog   12 units with regular meals   18 units with  larger meals    6 units with smaller meals.       If Blood sugar is high, take correction dose insulin using short acting insulin (any of the following is fine) Humalog/Novolog/Fiasp/Lyumjev    176 - 200 mg/dL= 1 unit  201 - 225 mg/dL= 2 units  226 - 250 mg/dL= 3 unit  251 - 300 mg/dL= 4 units  301 - 350 mg/dL= 6 units  greater than 350 mg/dL = 8 unit     Meal-time INSULIN: (any of the following is fine)  Novolog/Humalog/Fiasp/Lyumjev  Take 18 units With large meals. +  correction dose if  BG is high  Take 12 units With usual meals + correction dose if  BG is high

## 2024-09-13 NOTE — PROGRESS NOTES
Follow up Visit:  DM   Requesting Physician:   .Talia Pierce MD      CHIEF COMPLAINT:    Chief Complaint   Patient presents with    Diabetes     Pt is in for a Diabetes follow up. A1c check        HISTORY OF PRESENT ILLNESS:   Emi Montejo is a 59 year old female who presents with DM    Saw 2 endocrinology before but was managed by PCP    She thinks her pen was delivering insulin.   Stopped losartan d/t side effect.  Did not tolerate mounjaro  Had GI SE from metformin   Tried ozempic in the past x2 but had SE from it.  Was started on Januvia recently ~ 10/2023 and BG is better.  But had SE From it also   Does not like medications and prefers to take less   DM t2 with HTN, DLP, diabetic retinopathy and CAD s/p PCI mid LAD 95% stenosis with CINTHYA x 1 2023   worsening A1c 9.5  2023 from 8.2 2023    t2DM  Dx when was pregnant with her daughter ~. Was dx with DM in   Started insulin ~ 10 yrs ago     Now takes   Basaglar 42 to 50 units at night   Humalog  12 to 18 units, before meals    No hx of MTC or pancreatitis   Yeast/UTI was in . Gets them once a year    .DM quality measures:  A1C/Blood pressure: as reported above   Nephropathy screenin2023 continue ace /arb rx.   LIPID screenin2023 . On lipitor statin rx.   Last dilated eye exam: Last Dilated Eye Exam:  ()   Exam shows retinopathy? No data recorded  Last diabetic foot exam: Last Foot Exam:  (Yes)    Dentist : recommend every 6m    She is a  - has arthritis in the hands     Has periMP Sx now     2024  A1c 8.5    ASSESSMENT AND PLAN:  59 year old uncontrolled complicated DM w/ micro and macrovascular complications  S/p  CAD LAD PCI with microvascular complications/  neuropathy  On MDI and statin,  Stopped losartan   Eye exam 2024  Foot exam 2024- abnormal monofilament     Did not tolerate other meds ( MTF, GLP_1, GIP/GLP, DPP4i)   Will avoid mounjaro d/t GI SE  We discussed CGM again and will  do a trial /Freestyle  Plan  Fasting labs   RTC in 3 mo     Target BG   BEFORE MEAL 100-130mg/dl  2hrs AFTER MEAL less than 180mg/dl    Basaglar 45 units at night     Humalog   12 units with regular meals   18 units with  larger meals    6 units with smaller meals.       If Blood sugar is high, take correction dose insulin using short acting insulin (any of the following is fine) Humalog/Novolog/Fiasp/Lyumjev    176 - 200 mg/dL= 1 unit  201 - 225 mg/dL= 2 units  226 - 250 mg/dL= 3 unit  251 - 300 mg/dL= 4 units  301 - 350 mg/dL= 6 units  greater than 350 mg/dL = 8 unit     Meal-time INSULIN: (any of the following is fine)  Novolog/Humalog/Fiasp/Lyumjev  Take 18 units With large meals. +  correction dose if  BG is high  Take 12 units With usual meals + correction dose if  BG is high        PAST MEDICAL HISTORY:   Past Medical History:    ANEMIA    High blood pressure    History of blood transfusion    HTN (hypertension)    Hypercholesterolemia    Mild nonproliferative diabetic retinopathy without macular edema associated with type 2 diabetes mellitus (HCC)    OTHER DISEASES    uterine fibroids    Type II diabetes mellitus, uncontrolled    Type II or unspecified type diabetes mellitus without mention of complication, not stated as uncontrolled       PAST SURGICAL HISTORY:   Past Surgical History:   Procedure Laterality Date          Colonoscopy  6/15    hemorrhoids; repeat 10 yrs    Colonoscopy,diagnostic N/A 6/3/2015    Procedure: COLONOSCOPY, POSSIBLE BIOPSY, POSSIBLE POLYPECTOMY 71797;  Surgeon: Carlo Sullivan MD;  Location: [facility]    D & c      Exc skin benig 2.1-3cm trunk,arm,leg Left 2016    Procedure: EXCISION OF GROIN MASS;  Surgeon: George Mao MD;  Location: [facility]    Hysterectomy      done for fibroids (Clasen); ovaries left    Layr clos wnd trunk,arm,leg <2.5cm Left 2016    Procedure: EXCISION OF GROIN MASS;  Surgeon: George Mao MD;   [place] [facility] [place] [facility]       CURRENT MEDICATIONS:    Current Outpatient Medications   Medication Sig Dispense Refill    Continuous Glucose  (FREESTYLE MESFIN 3 READER) Does not apply Misc 1 each once for 1 dose. 1 each 0    Continuous Glucose Sensor (FREESTYLE MESFIN 3 PLUS SENSOR) Does not apply Misc 1 each every 14 (fourteen) days. To check glucose 6 each 0    Insulin Pen Needle (BD PEN NEEDLE ANASTASIA U/F) 32G X 4 MM Does not apply Misc 1 strip by In Vitro route 4 (four) times daily. 400 each 1    atorvastatin 40 MG Oral Tab Take 1 tablet (40 mg total) by mouth nightly. 90 tablet 3    BASAGLAR KWIKPEN 100 UNIT/ML Subcutaneous Solution Pen-injector Inject 50 Units into the skin nightly. 39 mL 0    HUMALOG KWIKPEN 100 UNIT/ML Subcutaneous Solution Pen-injector Inject 20 Units into the skin 3 (three) times daily with meals. 15 mL 3    Glucose Blood (CONTOUR NEXT TEST) In Vitro Strip TEST BLOOD SUGAR THREE TIMES DAILY 300 strip 1    SEMGLEE, YFGN, 100 UNIT/ML Subcutaneous Solution Pen-injector Inject 50 Units/day into the skin daily.      losartan 100 MG Oral Tab Take 1 tablet (100 mg total) by mouth every evening. 90 tablet 3    ticagrelor 90 MG Oral Tab Take 1 tablet (90 mg total) by mouth every 12 (twelve) hours. 180 tablet 3    amLODIPine 2.5 MG Oral Tab Take 1 tablet (2.5 mg total) by mouth daily.      aspirin 81 MG Oral Tab EC Take 1 tablet (81 mg total) by mouth daily.      linaCLOtide (LINZESS) 72 MCG Oral Cap Take by mouth at bedtime.      phenylephrine-min oil-abe (PREPARATION H) 0.25-14-74.9 % Rectal Ointment Place 1 g rectally 2 (two) times daily as needed for Hemorrhoids. 28 g 0    Hydrocortisone (ANUCORT-HC) 25 MG Rectal Suppos Place 1 suppository (25 mg total) rectally at bedtime. 30 suppository 0    ACCU-CHEK ERIS PLUS In Vitro Strip USE TO TEST BLOOD SUGAR THREE TIMES DAILY AS DIRECTED 300 strip 3    BD PEN NEEDLE ANASTASIA U/F 32G X 4 MM Does not apply Misc Use twice daily with Lantus &  Novolog insulin pens 100 each 6    Lancets (ASCENSIA MICROLET) Does not apply Misc Test 3 times daily 100 Each 11       ALLERGIES:  No Known Allergies    SOCIAL HISTORY:    Social History     Socioeconomic History    Marital status:    Occupational History    Occupation: cosmotologist   Tobacco Use    Smoking status: Never    Smokeless tobacco: Never   Vaping Use    Vaping status: Never Used   Substance and Sexual Activity    Alcohol use: Not Currently    Drug use: No    Sexual activity: Yes     Partners: Male     Birth control/protection: Hysterectomy       FAMILY HISTORY:   Family History   Problem Relation Age of Onset    Diabetes Maternal Grandmother     Eye Problems Maternal Grandmother         cataracts/iol    Diabetes Other         aunt    Breast Cancer Maternal Aunt 60        age 60      Daughter with t2DM seeing Dr Ralph       PHYSICAL EXAM:   Height: 5' 7\" (170.2 cm) (09/13 1548)  Weight: 201 lb (91.2 kg) (09/13 1548)  BSA (Calculated - sq m): 2.03 sq meters (09/13 1548)  Pulse: 77 (09/13 1548)  BP: 131/76 (09/13 1548)  Temp: --  Do Not Use - Resp Rate: --  SpO2: --      CONSTITUTIONAL:  Awake and alert. Age appropriate, good hygiene not in acute distress. Well nourished and well developed. no acute distress   PSYCH:   Orientated to time, place, person & situation, Normal mood and affect, memory intact, normal insight and judgment, cooperative  Neuro: speech is clear. Awake, alert, no aphasia, no facial asymmetry, no nuchal rigidity  EYES:  No proptosis, no ptosis, conjunctiva normal  ENT:  Normocephalic, atraumatic  Eye: EOMI, normal lids, no discharge, no conjunctival erythema. No exophthalmos/proptosis, Ptosis negative   No rhinorrhea, moist oral mucosa  Neck: full range of motion  Neck/Thyroid: neck inspection: normal, No scar, No goiter        DATA:     Pertinent data reviewed    Orders Placed This Encounter   Procedures    POC HemoCue Glucose 201 (Finger stick glucose)    POC  Glycohemoglobin [10323]    Microalb/Creat Ratio, Random Urine    TSH W Reflex To Free T4    Lipid Panel    Comp Metabolic Panel (14)              Lab Results   Component Value Date    A1C 8.5 (A) 09/13/2024    A1C 9.3 (A) 12/04/2023    A1C 6.6 (H) 08/09/2021    A1C 5.8 (A) 07/06/2020    A1C 6.1 (A) 01/02/2020      Cholesterol: 171, done on 8/9/2021.  HDL Cholesterol: 51, done on 8/9/2021.  LDL Cholesterol: 101, done on 8/9/2021.  TriGlycerides 94, done on 8/9/2021.             No results for input(s): \"TSH\", \"T4F\", \"T3F\", \"THYP\" in the last 72 hours.  No results found.  POC HemoCue Glucose 201 (Finger stick glucose)        Component Value Flag Ref Range Units Status    GLUCOSE BLOOD 211        Final    Test Strip Lot # [id number]       Numeric Final    Test Strip Expiration Date 12/6/24       Date Final                  POC Glycohemoglobin [23805]        Component Value Flag Ref Range Units Status    HEMOGLOBIN A1C 8.5      4.3 - 5.6 % Final    Cartridge Lot# [id number]       Numeric Final    Cartridge Expiration Date 5/23/26       Date Final                  Orders Placed This Encounter   Procedures    POC HemoCue Glucose 201 (Finger stick glucose)    POC Glycohemoglobin [78155]    Microalb/Creat Ratio, Random Urine    TSH W Reflex To Free T4    Lipid Panel    Comp Metabolic Panel (14)     Orders Placed This Encounter    POC HemoCue Glucose 201 (Finger stick glucose)     Order Specific Question:   Release to patient     Answer:   Immediate    POC Glycohemoglobin [09232]     Order Specific Question:   Release to patient     Answer:   Immediate    Microalb/Creat Ratio, Random Urine     Standing Status:   Future     Standing Expiration Date:   9/13/2025     Order Specific Question:   Release to patient     Answer:   Immediate    TSH W Reflex To Free T4     Standing Status:   Future     Standing Expiration Date:   9/13/2025     Order Specific Question:   Release to patient     Answer:   Immediate    Lipid Panel      Standing Status:   Future     Standing Expiration Date:   2025     Order Specific Question:   Release to patient     Answer:   Immediate    Comp Metabolic Panel (14)     Standing Status:   Future     Standing Expiration Date:   2025     Order Specific Question:   Release to patient     Answer:   Immediate    Continuous Glucose  (FREESTYLE MESFIN 3 READER) Does not apply Misc     Si each once for 1 dose.     Dispense:  1 each     Refill:  0    Continuous Glucose Sensor (FREESTYLE MESFIN 3 PLUS SENSOR) Does not apply Misc     Si each every 14 (fourteen) days. To check glucose     Dispense:  6 each     Refill:  0    Insulin Pen Needle (BD PEN NEEDLE ANASTASIA U/F) 32G X 4 MM Does not apply Misc     Si strip by In Vitro route 4 (four) times daily.     Dispense:  400 each     Refill:  1    atorvastatin 40 MG Oral Tab     Sig: Take 1 tablet (40 mg total) by mouth nightly.     Dispense:  90 tablet     Refill:  3    BASAGLAR KWIKPEN 100 UNIT/ML Subcutaneous Solution Pen-injector     Sig: Inject 50 Units into the skin nightly.     Dispense:  39 mL     Refill:  0    HUMALOG KWIKPEN 100 UNIT/ML Subcutaneous Solution Pen-injector     Sig: Inject 20 Units into the skin 3 (three) times daily with meals.     Dispense:  15 mL     Refill:  3      TSH   Date Value Ref Range Status   2021 2.620 0.270 - 4.200 mIU/L Final   2011 2.280 0.450 - 4.500 uIU/mL Final      This is a specialized patient consultation in endocrinology and required comprehensive review of prior records, as well as current evaluation, with time required for consideration of complex endocrine issues and consultation. For this visit, I personally interviewed the patient, and family member if accompanied, performed the pertinent parts of the history and physical examination. ROS included screening for appropriate endocrine conditions.   Today's diagnosis and plan were reviewed in detail with the patient who states understanding and  agrees with plan. I discussed with the patient possible diagnosis, differential diagnosis, need for work up, treatment options, alternatives and side effects.     Please see note for details about time spent which includes:   · pre-visit preparation  · reviewing records  · face to face time with the patient   · timely documentation of the encounter  · ordering medications/tests  · communication with care team  · care coordination    I appreciate the opportunity to be part of your patient's medical care and will keep you, as the referring and primary physicians, informed about the care of your patient. Please feel free to contact me should you have any questions.      The 21st Century Cures Act makes medical notes like these available to patients in the interest of transparency. Please be advised this is a medical document. Medical documents are intended to carry relevant information, facts as evident, and the clinical opinion of the practitioner. The medical note is intended as peer to peer communication and may appear blunt or direct. It is written in medical language and may contain abbreviations or verbiage that are unfamiliar.     Trena Contreras MD

## 2024-09-15 ENCOUNTER — LAB ENCOUNTER (OUTPATIENT)
Dept: LAB | Facility: HOSPITAL | Age: 59
End: 2024-09-15
Attending: INTERNAL MEDICINE
Payer: COMMERCIAL

## 2024-09-15 DIAGNOSIS — Z79.4 TYPE 2 DIABETES MELLITUS WITH HYPERGLYCEMIA, WITH LONG-TERM CURRENT USE OF INSULIN (HCC): ICD-10-CM

## 2024-09-15 DIAGNOSIS — I25.10 CORONARY ARTERY DISEASE INVOLVING NATIVE CORONARY ARTERY OF NATIVE HEART, UNSPECIFIED WHETHER ANGINA PRESENT: ICD-10-CM

## 2024-09-15 DIAGNOSIS — E07.9 THYROID DISEASE: ICD-10-CM

## 2024-09-15 DIAGNOSIS — E11.65 TYPE 2 DIABETES MELLITUS WITH HYPERGLYCEMIA, WITH LONG-TERM CURRENT USE OF INSULIN (HCC): ICD-10-CM

## 2024-09-15 DIAGNOSIS — E78.00 HYPERCHOLESTEROLEMIA: ICD-10-CM

## 2024-09-15 DIAGNOSIS — E11.59 HYPERTENSION ASSOCIATED WITH DIABETES (HCC): ICD-10-CM

## 2024-09-15 DIAGNOSIS — E78.2 MIXED HYPERLIPIDEMIA: ICD-10-CM

## 2024-09-15 DIAGNOSIS — E11.42 DIABETIC PERIPHERAL NEUROPATHY (HCC): ICD-10-CM

## 2024-09-15 DIAGNOSIS — R73.09 HIGH GLUCOSE LEVEL: ICD-10-CM

## 2024-09-15 DIAGNOSIS — E11.21 DIABETIC NEPHROPATHY ASSOCIATED WITH TYPE 2 DIABETES MELLITUS (HCC): ICD-10-CM

## 2024-09-15 DIAGNOSIS — I15.2 HYPERTENSION ASSOCIATED WITH DIABETES (HCC): ICD-10-CM

## 2024-09-15 LAB
ALBUMIN SERPL-MCNC: 4.1 G/DL (ref 3.2–4.8)
ALBUMIN/GLOB SERPL: 1.8 {RATIO} (ref 1–2)
ALP LIVER SERPL-CCNC: 56 U/L
ALT SERPL-CCNC: 26 U/L
ANION GAP SERPL CALC-SCNC: 5 MMOL/L (ref 0–18)
AST SERPL-CCNC: 20 U/L (ref ?–34)
BILIRUB SERPL-MCNC: 0.6 MG/DL (ref 0.3–1.2)
BUN BLD-MCNC: 16 MG/DL (ref 9–23)
BUN/CREAT SERPL: 21.1 (ref 10–20)
CALCIUM BLD-MCNC: 9.3 MG/DL (ref 8.7–10.4)
CHLORIDE SERPL-SCNC: 114 MMOL/L (ref 98–112)
CHOLEST SERPL-MCNC: 116 MG/DL (ref ?–200)
CO2 SERPL-SCNC: 28 MMOL/L (ref 21–32)
CREAT BLD-MCNC: 0.76 MG/DL
EGFRCR SERPLBLD CKD-EPI 2021: 90 ML/MIN/1.73M2 (ref 60–?)
FASTING PATIENT LIPID ANSWER: NO
FASTING STATUS PATIENT QL REPORTED: NO
GLOBULIN PLAS-MCNC: 2.3 G/DL (ref 2–3.5)
GLUCOSE BLD-MCNC: 121 MG/DL (ref 70–99)
HDLC SERPL-MCNC: 41 MG/DL (ref 40–59)
LDLC SERPL CALC-MCNC: 59 MG/DL (ref ?–100)
NONHDLC SERPL-MCNC: 75 MG/DL (ref ?–130)
OSMOLALITY SERPL CALC.SUM OF ELEC: 306 MOSM/KG (ref 275–295)
POTASSIUM SERPL-SCNC: 3.7 MMOL/L (ref 3.5–5.1)
PROT SERPL-MCNC: 6.4 G/DL (ref 5.7–8.2)
SODIUM SERPL-SCNC: 147 MMOL/L (ref 136–145)
TRIGL SERPL-MCNC: 78 MG/DL (ref 30–149)
TSI SER-ACNC: 2.3 MIU/ML (ref 0.55–4.78)
VLDLC SERPL CALC-MCNC: 11 MG/DL (ref 0–30)

## 2024-09-15 PROCEDURE — 80061 LIPID PANEL: CPT

## 2024-09-15 PROCEDURE — 84443 ASSAY THYROID STIM HORMONE: CPT

## 2024-09-15 PROCEDURE — 36415 COLL VENOUS BLD VENIPUNCTURE: CPT

## 2024-09-15 PROCEDURE — 80053 COMPREHEN METABOLIC PANEL: CPT

## 2024-09-17 ENCOUNTER — TELEPHONE (OUTPATIENT)
Dept: ENDOCRINOLOGY CLINIC | Facility: CLINIC | Age: 59
End: 2024-09-17

## 2024-09-17 NOTE — TELEPHONE ENCOUNTER
Continuous Glucose Sensor (FREESTYLE MESFIN 3 PLUS SENSOR) Does not apply Misc, 1 each every 14 (fourteen) days. To check glucose, Disp: 6 each, Rfl: 0  REQUESTS NEW RX THIS MED IS NOT  COVERED

## 2024-09-18 NOTE — TELEPHONE ENCOUNTER
Medication PA Requested:    Continuous Glucose Sensor (FREESTYLE MESFIN 3 PLUS SENSOR)                                                CoverMyMeds Used:   Key:   Quantity: 6 sensors   Day Supply: 3 month supply  Si each every 15 days. To check glucose   DX Code:   E11.65                                  Case Number/Pending Ref#:    Routing to PA dept for assistance, thanks.

## 2024-09-18 NOTE — TELEPHONE ENCOUNTER
Medication PA Requested:    Continuous Glucose Sensor (FREESTYLE MESFIN 3 PLUS SENSOR)                                                CoverMyMeds Used:  No  Key:   Quantity: 6 sensors   Day Supply: 3 month supply  Si each every 15 days. To check glucose   DX Code:   E11.65     Electronic prior authorization submitted, last office visit  and A1C   Awaiting determination

## 2024-09-20 ENCOUNTER — LAB ENCOUNTER (OUTPATIENT)
Dept: LAB | Facility: HOSPITAL | Age: 59
End: 2024-09-20
Attending: INTERNAL MEDICINE
Payer: COMMERCIAL

## 2024-09-20 DIAGNOSIS — E78.00 HYPERCHOLESTEROLEMIA: ICD-10-CM

## 2024-09-20 DIAGNOSIS — I15.2 HYPERTENSION ASSOCIATED WITH DIABETES (HCC): ICD-10-CM

## 2024-09-20 DIAGNOSIS — E78.2 MIXED HYPERLIPIDEMIA: ICD-10-CM

## 2024-09-20 DIAGNOSIS — E07.9 THYROID DISEASE: ICD-10-CM

## 2024-09-20 DIAGNOSIS — R73.09 HIGH GLUCOSE LEVEL: ICD-10-CM

## 2024-09-20 DIAGNOSIS — Z79.4 TYPE 2 DIABETES MELLITUS WITH HYPERGLYCEMIA, WITH LONG-TERM CURRENT USE OF INSULIN (HCC): ICD-10-CM

## 2024-09-20 DIAGNOSIS — E11.42 DIABETIC PERIPHERAL NEUROPATHY (HCC): ICD-10-CM

## 2024-09-20 DIAGNOSIS — E11.21 DIABETIC NEPHROPATHY ASSOCIATED WITH TYPE 2 DIABETES MELLITUS (HCC): ICD-10-CM

## 2024-09-20 DIAGNOSIS — E11.65 TYPE 2 DIABETES MELLITUS WITH HYPERGLYCEMIA, WITH LONG-TERM CURRENT USE OF INSULIN (HCC): ICD-10-CM

## 2024-09-20 DIAGNOSIS — I25.10 CORONARY ARTERY DISEASE INVOLVING NATIVE CORONARY ARTERY OF NATIVE HEART, UNSPECIFIED WHETHER ANGINA PRESENT: ICD-10-CM

## 2024-09-20 DIAGNOSIS — E11.59 HYPERTENSION ASSOCIATED WITH DIABETES (HCC): ICD-10-CM

## 2024-09-20 LAB
CREAT UR-SCNC: 142.2 MG/DL
MICROALBUMIN UR-MCNC: <0.3 MG/DL

## 2024-09-20 PROCEDURE — 82570 ASSAY OF URINE CREATININE: CPT

## 2024-09-20 PROCEDURE — 82043 UR ALBUMIN QUANTITATIVE: CPT

## 2024-09-20 RX ORDER — ACYCLOVIR 400 MG/1
1 TABLET ORAL AS DIRECTED
Qty: 1 EACH | Refills: 0 | Status: SHIPPED | OUTPATIENT
Start: 2024-09-20

## 2024-09-20 RX ORDER — ACYCLOVIR 400 MG/1
1 TABLET ORAL
Qty: 9 EACH | Refills: 1 | Status: SHIPPED | OUTPATIENT
Start: 2024-09-20

## 2024-09-20 NOTE — TELEPHONE ENCOUNTER
Endocrine Refill protocol for CGM supplies     Protocol Criteria:  PASSED Reason: N/A  Appointment with Endocrinology completed in the last 12 months or scheduled in the next 6 months     Verify appointment has been completed or scheduled in the appropriate timeline. If so can send a 90 day supply with 1 refill.     Last completed office visit:9/13/2024 Trena Contreras MD

## 2024-09-23 RX ORDER — PEN NEEDLE, DIABETIC 32GX 5/32"
NEEDLE, DISPOSABLE MISCELLANEOUS
Qty: 100 EACH | Refills: 6 | Status: SHIPPED | OUTPATIENT
Start: 2024-09-23

## 2024-09-23 NOTE — TELEPHONE ENCOUNTER
Endocrine Refill protocol for Glucose testing supplies     Protocol Criteria: PASSED Reason: N/A  Appointment with Endocrinology completed in the last 12 months or scheduled in the next 6 months     Verify appointment has been completed or scheduled in the appropriate timeline. If so can send a 90 day supply with 1 refill.     Last completed office visit: 9/13/2024 Trena Contreras MD   Next scheduled Follow up: no future appt

## 2024-09-23 NOTE — TELEPHONE ENCOUNTER
Patient called to request a refill for the BD PEN NEEDLE ANASTASIA. Please send the refill to the [facility] in [facility]. Patient also wanted to speak with a nurse in regards to the FREESTYLE MESFIN 3 PLUS SENSOR. Please call.       Insulin Pen Needle (BD PEN NEEDLE ANASTASIA U/F) 32G X 4 MM Does not apply Misc, 1 strip by In Vitro route 4 (four) times daily., Disp: 400 each, Rfl: 1      Continuous Glucose Sensor (FREESTYLE MESFIN 3 PLUS SENSOR) Does not apply Misc, 1 each every 14 (fourteen) days. To check glucose, Disp: 6 each, Rfl: 0

## 2024-09-24 ENCOUNTER — PATIENT MESSAGE (OUTPATIENT)
Facility: CLINIC | Age: 59
End: 2024-09-24

## 2024-09-26 NOTE — TELEPHONE ENCOUNTER
Called [facility] and was notified that Estela sensors were denied because pt has not t/f 3 preferred products but rep was not able to provide the preferred products. Was transferred to Pharmacy Helpdesk for further explanation.     Per rep Sahra,   Dexcom is covered and does not require PA.     Last claim was run 9/23 and estimated cost is $0. Tried to call pharmacy to confirm but they are closed until 9 am.     Pt notified.

## 2024-09-30 NOTE — TELEPHONE ENCOUNTER
Patient came In and wanted to share the information behind the Dexcom.Patient had contacted the pharmacy and they had stated that she will still need to pay $400.Patient is unaware of what to do next and needs more recommendations and guidance on this subject since the sensor had  over the weekend. Please call patient with more information and guidance.

## 2024-10-01 NOTE — TELEPHONE ENCOUNTER
Called pharmacy and was notified that it would be $375 for 3 month supply and for the  $12 due to pt deductible.     Remaining deductible: $4,055    Pharm tech ran Estela boucher, drug not on formulary.     Called pt, RANULFO. [facility] sent with info for Shruthi.

## 2024-10-02 NOTE — TELEPHONE ENCOUNTER
Called pt and notified that she can order Stelo directly on website. Pt verbalized understanding.

## 2024-10-19 DIAGNOSIS — E11.59 HYPERTENSION ASSOCIATED WITH DIABETES (HCC): ICD-10-CM

## 2024-10-19 DIAGNOSIS — R73.09 HIGH GLUCOSE LEVEL: ICD-10-CM

## 2024-10-19 DIAGNOSIS — E07.9 THYROID DISEASE: ICD-10-CM

## 2024-10-19 DIAGNOSIS — E78.00 HYPERCHOLESTEROLEMIA: ICD-10-CM

## 2024-10-19 DIAGNOSIS — E11.65 TYPE 2 DIABETES MELLITUS WITH HYPERGLYCEMIA, WITH LONG-TERM CURRENT USE OF INSULIN (HCC): ICD-10-CM

## 2024-10-19 DIAGNOSIS — I25.10 CORONARY ARTERY DISEASE INVOLVING NATIVE CORONARY ARTERY OF NATIVE HEART, UNSPECIFIED WHETHER ANGINA PRESENT: ICD-10-CM

## 2024-10-19 DIAGNOSIS — Z79.4 TYPE 2 DIABETES MELLITUS WITH HYPERGLYCEMIA, WITH LONG-TERM CURRENT USE OF INSULIN (HCC): ICD-10-CM

## 2024-10-19 DIAGNOSIS — E11.21 DIABETIC NEPHROPATHY ASSOCIATED WITH TYPE 2 DIABETES MELLITUS (HCC): ICD-10-CM

## 2024-10-19 DIAGNOSIS — E78.2 MIXED HYPERLIPIDEMIA: ICD-10-CM

## 2024-10-19 DIAGNOSIS — I15.2 HYPERTENSION ASSOCIATED WITH DIABETES (HCC): ICD-10-CM

## 2024-10-19 DIAGNOSIS — E11.42 DIABETIC PERIPHERAL NEUROPATHY (HCC): ICD-10-CM

## 2024-10-19 RX ORDER — INSULIN GLARGINE 100 [IU]/ML
45 INJECTION, SOLUTION SUBCUTANEOUS NIGHTLY
Qty: 15 ML | Refills: 0 | Status: SHIPPED | OUTPATIENT
Start: 2024-10-19

## 2024-10-19 NOTE — TELEPHONE ENCOUNTER
Endocrine refill protocol for basal insulins     Protocol Criteria: FAILED Reason: Elevated A1C    If all below requirements are met, send a 90-day supply with 1 refill per provider protocol.       Verify Appointment with Endocrinology completed in the last 6 months or scheduled in the next 3 months.  Verify A1C has been completed within the last 6 months and is below 8.5%     Last completed office visit:9/13/2024 Trena Contreras MD   Next scheduled Follow up:   Future Appointments   Date Time Provider Department Center   10/23/2024  8:20 AM Mary Lynn MD [facility]      Last A1c result: Last A1c value was 8.5% done 9/13/2024.

## 2024-10-23 ENCOUNTER — OFFICE VISIT (OUTPATIENT)
Dept: OBGYN CLINIC | Facility: CLINIC | Age: 59
End: 2024-10-23
Payer: COMMERCIAL

## 2024-10-23 VITALS
HEIGHT: 67 IN | BODY MASS INDEX: 31.55 KG/M2 | SYSTOLIC BLOOD PRESSURE: 140 MMHG | WEIGHT: 201 LBS | HEART RATE: 70 BPM | DIASTOLIC BLOOD PRESSURE: 84 MMHG

## 2024-10-23 DIAGNOSIS — Z01.419 ENCOUNTER FOR GYNECOLOGICAL EXAMINATION: Primary | ICD-10-CM

## 2024-10-23 DIAGNOSIS — Z12.31 ENCOUNTER FOR SCREENING MAMMOGRAM FOR BREAST CANCER: ICD-10-CM

## 2024-10-23 PROCEDURE — 99386 PREV VISIT NEW AGE 40-64: CPT | Performed by: OBSTETRICS & GYNECOLOGY

## 2024-10-23 NOTE — PROGRESS NOTES
Emi Montejo is a 59 year old female  No LMP recorded (lmp unknown). Patient has had a hysterectomy. here for annual exam.       New pt.   Last pap 2022 normal with negative HPV elsewhere.  Denies abnormal pap    History of hysterectomy for fibroids.   Having some hot flashes, moodiness, joint aches.    Has DM and HTN    OBSTETRICS HISTORY:  OB History    Para Term  AB Living   1 1 1 0 0 1   SAB IAB Ectopic Multiple Live Births   0 0 0 0 1   Obstetric Comments   On delivery by CS 20 years ago.       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)    Type II or unspecified type diabetes mellitus without mention of complication, not stated as uncontrolled     Past Surgical History:   Procedure Laterality Date          Colonoscopy  6/15    hemorrhoids; repeat 10 yrs    Colonoscopy,diagnostic N/A 6/3/2015    Procedure: COLONOSCOPY, POSSIBLE BIOPSY, POSSIBLE POLYPECTOMY 36843;  Surgeon: Carlo Sullivan MD;  Location: [facility]    D & c      Exc skin benig 2.1-3cm trunk,arm,leg Left 2016    Procedure: EXCISION OF GROIN MASS;  Surgeon: George Mao MD;  Location: [facility]    Hysterectomy      done for fibroids ([place]); ovaries left    Layr clos wnd trunk,arm,leg <2.5cm Left 2016    Procedure: EXCISION OF GROIN MASS;  Surgeon: George Mao MD;  Location: [facility]       SOCIAL HISTORY:  Social History     Socioeconomic History    Marital status:    Occupational History    Occupation: cosmotologist   Tobacco Use    Smoking status: Never    Smokeless tobacco: Never   Vaping Use    Vaping status: Never Used   Substance and Sexual Activity    Alcohol use: Not  Currently    Drug use: No    Sexual activity: Yes     Partners: Male     Birth control/protection: Hysterectomy     Social Drivers of Health     Food Insecurity: No Food Insecurity (4/17/2024)    Received from [facility]    Food Insecurity     Currently or in the past 3 months, have you worried your food would run out before you had money to buy more?: No     In the past 12 months, have you run out of food or been unable to get more?: No   Transportation Needs: No Transportation Needs (4/17/2024)    Received from [facility]    Transportation Needs     Medical Transportation Needs?: No    Received from [facility]    Housing Stability       FAMILY HISTORY:  Family History   Problem Relation Age of Onset    Diabetes Maternal Grandmother     Eye Problems Maternal Grandmother         cataracts/iol    Diabetes Other         aunt    Breast Cancer Maternal Aunt 60        age 60       MEDICATIONS:  Current Outpatient Medications   Medication Sig Dispense Refill    BASAGLAR KWIKPEN 100 UNIT/ML Subcutaneous Solution Pen-injector INJECT 45 UNITS INTO THE SKIN NIGHTLY. 15 mL 0    BD PEN NEEDLE ANASTASIA U/F 32G X 4 MM Does not apply Misc Use twice daily with Lantus & Novolog insulin pens 100 each 6    Continuous Glucose Sensor (DEXCOM G7 SENSOR) Does not apply Misc 1 each Every 10 days. Use as directed every 10 days 9 each 1    Continuous Glucose  (DEXCOM G7 ) Does not apply Device 1 each As Directed. 1 each 0    Continuous Glucose Sensor (FREESTYLE MESFIN 3 PLUS SENSOR) Does not apply Misc 1 each every 14 (fourteen) days. To check glucose 6 each 0    Insulin Pen Needle (BD PEN NEEDLE ANASTASIA U/F) 32G X 4 MM Does not apply Misc 1 strip by In Vitro route 4 (four) times daily. 400 each 1    atorvastatin 40 MG Oral Tab Take 1 tablet (40 mg total) by mouth nightly. 90 tablet 3    HUMALOG KWIKPEN 100 UNIT/ML Subcutaneous Solution Pen-injector Inject 20 Units into the  skin 3 (three) times daily with meals. 15 mL 3    Glucose Blood (CONTOUR NEXT TEST) In Vitro Strip TEST BLOOD SUGAR THREE TIMES DAILY 300 strip 1    amLODIPine 2.5 MG Oral Tab Take 1 tablet (2.5 mg total) by mouth daily.      aspirin 81 MG Oral Tab EC Take 1 tablet (81 mg total) by mouth daily.      ACCU-CHEK ERIS PLUS In Vitro Strip USE TO TEST BLOOD SUGAR THREE TIMES DAILY AS DIRECTED 300 strip 3    Lancets (ASCENSIA MICROLET) Does not apply Misc Test 3 times daily 100 Each 11       ALLERGIES:  Allergies[1]      Depression Screening (PHQ-2/PHQ-9): Over the LAST 2 WEEKS   Little interest or pleasure in doing things (over the last two weeks)?: Not at all    Feeling down, depressed, or hopeless (over the last two weeks)?: Not at all    PHQ-2 SCORE: 0           Review of Systems:  Constitutional:  Denies fatigue, night sweats, hot flashes  Eyes:  denies blurred or double vision  Cardiovascular:  denies chest pain or palpitations  Respiratory:  denies shortness of breath  Gastrointestinal:  denies heartburn, abdominal pain, diarrhea or constipation  Genitourinary:  denies dysuria, incontinence, abnormal vaginal discharge, vaginal itching  Musculoskeletal:  denies back pain.  Skin/Breast:  Denies any breast pain, lumps, or discharge.   Neurological:  denies headaches, extremity weakness or numbness.  Psychiatric: denies depression or anxiety.  Endocrine:   denies excessive thirst or urination.  Heme/Lymph:  easy bruising or bleeding.    PHYSICAL EXAM:   /84   Pulse 70   Ht 5' 7\" (1.702 m)   Wt 201 lb (91.2 kg)   LMP  (LMP Unknown)   BMI 31.48 kg/m²   Wt Readings from Last 2 Encounters:   10/23/24 201 lb (91.2 kg)   09/13/24 201 lb (91.2 kg)     Body mass index is 31.48 kg/m².    Constitutional: well developed, well nourished  Neck/Thyroid: thyroid symmetric, no nodules  Heart:  Regular rate and rhythm  Lungs:  Clear to asculation  Breast: normal without palpable masses, tenderness, asymmetry, nipple  discharge, nipple retraction or skin changes  Abdomen:  soft, nontender, nondistended, no masses  Psychiatric:  Oriented to time, place, person and situation. Appropriate mood and affect    Pelvic Exam:  External Genitalia: normal appearance, hair distribution, and no lesions  Urethral Meatus:  normal in size, location, without lesions and prolapse  Bladder:  No fullness, masses or tenderness  Vagina:  Normal appearance without lesions, no abnormal discharge  Uterus surgically removed.  No pelvic masses.      Assessment & Plan:    Emi Montejo is a 59 year old female who presents for an annual physical exam.    1. Encounter for gynecological examination  Pap not done.  ASCCP guidelines reviewed.   Encouraged annual exam.   Order for mammogram.   RTC 1 year or prn     2. Encounter for screening mammogram for breast cancer  - [facility] MARVIN 2D+3D SCREENING BILAT (CPT=77067/84822); Future    3.  Menopause  Discussed HRT vs Veozah vs Effexor for hot flashes.  Pt wants to monitor.        Requested Prescriptions      No prescriptions requested or ordered in this encounter         Mary Lynn MD  10/23/2024  6:23 PM         [1] No Known Allergies

## 2024-11-09 DIAGNOSIS — E11.59 HYPERTENSION ASSOCIATED WITH DIABETES (HCC): ICD-10-CM

## 2024-11-09 DIAGNOSIS — E78.00 HYPERCHOLESTEROLEMIA: ICD-10-CM

## 2024-11-09 DIAGNOSIS — R73.09 HIGH GLUCOSE LEVEL: ICD-10-CM

## 2024-11-09 DIAGNOSIS — Z79.4 TYPE 2 DIABETES MELLITUS WITH HYPERGLYCEMIA, WITH LONG-TERM CURRENT USE OF INSULIN (HCC): ICD-10-CM

## 2024-11-09 DIAGNOSIS — E07.9 THYROID DISEASE: ICD-10-CM

## 2024-11-09 DIAGNOSIS — E78.2 MIXED HYPERLIPIDEMIA: ICD-10-CM

## 2024-11-09 DIAGNOSIS — E11.21 DIABETIC NEPHROPATHY ASSOCIATED WITH TYPE 2 DIABETES MELLITUS (HCC): ICD-10-CM

## 2024-11-09 DIAGNOSIS — I15.2 HYPERTENSION ASSOCIATED WITH DIABETES (HCC): ICD-10-CM

## 2024-11-09 DIAGNOSIS — I25.10 CORONARY ARTERY DISEASE INVOLVING NATIVE CORONARY ARTERY OF NATIVE HEART, UNSPECIFIED WHETHER ANGINA PRESENT: ICD-10-CM

## 2024-11-09 DIAGNOSIS — E11.42 DIABETIC PERIPHERAL NEUROPATHY (HCC): ICD-10-CM

## 2024-11-09 DIAGNOSIS — E11.65 TYPE 2 DIABETES MELLITUS WITH HYPERGLYCEMIA, WITH LONG-TERM CURRENT USE OF INSULIN (HCC): ICD-10-CM

## 2024-11-11 RX ORDER — INSULIN GLARGINE 100 [IU]/ML
50 INJECTION, SOLUTION SUBCUTANEOUS NIGHTLY
Qty: 15 ML | Refills: 0 | Status: SHIPPED | OUTPATIENT
Start: 2024-11-11

## 2024-11-11 NOTE — TELEPHONE ENCOUNTER
Endocrine refill protocol for basal insulins     Protocol Criteria: FAILED Reason: Elevated A1C    If all below requirements are met, send a 90-day supply with 1 refill per provider protocol.       Verify Appointment with Endocrinology completed in the last 6 months or scheduled in the next 3 months.  Verify A1C has been completed within the last 6 months and is below 8.5%     Last completed office visit:9/13/2024 Trena Contreras MD   Next scheduled Follow up:   Future Appointments   Date Time Provider Department Center   11/26/2024  8:00 AM RACHELL JAQUEZ 1 [facility] [place]      Last A1c result: Last A1c value was 8.5% done 9/13/2024.

## 2024-11-26 ENCOUNTER — HOSPITAL ENCOUNTER (OUTPATIENT)
Dept: MAMMOGRAPHY | Age: 59
Discharge: HOME | End: 2024-11-26
Attending: OBSTETRICS & GYNECOLOGY
Payer: COMMERCIAL

## 2024-11-26 DIAGNOSIS — Z12.31 ENCOUNTER FOR SCREENING MAMMOGRAM FOR BREAST CANCER: ICD-10-CM

## 2024-11-26 PROCEDURE — 77067 SCR MAMMO BI INCL CAD: CPT | Performed by: OBSTETRICS & GYNECOLOGY

## 2024-11-26 PROCEDURE — 77063 BREAST TOMOSYNTHESIS BI: CPT | Performed by: OBSTETRICS & GYNECOLOGY

## 2024-11-27 DIAGNOSIS — Z79.4 TYPE 2 DIABETES MELLITUS WITH HYPERGLYCEMIA, WITH LONG-TERM CURRENT USE OF INSULIN (HCC): ICD-10-CM

## 2024-11-27 DIAGNOSIS — E11.65 TYPE 2 DIABETES MELLITUS WITH HYPERGLYCEMIA, WITH LONG-TERM CURRENT USE OF INSULIN (HCC): ICD-10-CM

## 2024-11-27 NOTE — TELEPHONE ENCOUNTER
Endocrine Refill protocol for Glucose testing supplies     Protocol Criteria: PASSED Reason: N/A    If below requirement is met, send a 90-day supply with 1 refill per provider protocol.    Verify appointment with Endocrinology completed in the last 6 months or scheduled in the next 3 months.    Last completed office visit: 9/13/2024 Trena Contreras MD   Next scheduled Follow up: No future appointments.

## 2024-12-20 ENCOUNTER — TELEPHONE (OUTPATIENT)
Dept: ENDOCRINOLOGY CLINIC | Facility: CLINIC | Age: 59
End: 2024-12-20

## 2024-12-20 NOTE — TELEPHONE ENCOUNTER
Medication PA Requested:  MOUNJARO 2.5MG/0.5 MG auto-injection                                                         CoverMyMeds Used: yes  Key:[id number]   Quantity:  2ml  Day Supply: 90  Sig:  Inject 2.5 mg into the skin every 7 days.   DX Code:       E11.65

## 2024-12-23 DIAGNOSIS — Z79.4 TYPE 2 DIABETES MELLITUS WITH HYPERGLYCEMIA, WITH LONG-TERM CURRENT USE OF INSULIN (HCC): Primary | ICD-10-CM

## 2024-12-23 DIAGNOSIS — E07.9 THYROID DISEASE: ICD-10-CM

## 2024-12-23 DIAGNOSIS — E78.00 HYPERCHOLESTEROLEMIA: ICD-10-CM

## 2024-12-23 DIAGNOSIS — E11.59 HYPERTENSION ASSOCIATED WITH DIABETES (HCC): ICD-10-CM

## 2024-12-23 DIAGNOSIS — E11.65 TYPE 2 DIABETES MELLITUS WITH HYPERGLYCEMIA, WITH LONG-TERM CURRENT USE OF INSULIN (HCC): Primary | ICD-10-CM

## 2024-12-23 DIAGNOSIS — E11.21 DIABETIC NEPHROPATHY ASSOCIATED WITH TYPE 2 DIABETES MELLITUS (HCC): ICD-10-CM

## 2024-12-23 DIAGNOSIS — E11.42 DIABETIC PERIPHERAL NEUROPATHY (HCC): ICD-10-CM

## 2024-12-23 DIAGNOSIS — I25.10 CORONARY ARTERY DISEASE INVOLVING NATIVE CORONARY ARTERY OF NATIVE HEART, UNSPECIFIED WHETHER ANGINA PRESENT: ICD-10-CM

## 2024-12-23 DIAGNOSIS — I15.2 HYPERTENSION ASSOCIATED WITH DIABETES (HCC): ICD-10-CM

## 2024-12-23 DIAGNOSIS — R73.09 HIGH GLUCOSE LEVEL: ICD-10-CM

## 2024-12-23 DIAGNOSIS — E78.2 MIXED HYPERLIPIDEMIA: ICD-10-CM

## 2024-12-23 RX ORDER — INSULIN ASPART 100 [IU]/ML
INJECTION, SOLUTION INTRAVENOUS; SUBCUTANEOUS
Qty: 54 ML | Refills: 1 | Status: SHIPPED | OUTPATIENT
Start: 2024-12-23

## 2024-12-23 RX ORDER — INSULIN LISPRO 100 [IU]/ML
20 INJECTION, SOLUTION INTRAVENOUS; SUBCUTANEOUS
Qty: 15 ML | Refills: 3 | Status: SHIPPED | OUTPATIENT
Start: 2024-12-23

## 2024-12-23 NOTE — TELEPHONE ENCOUNTER
Patient states that her insurance will no longer cover Humalog.  She was informed that Novolog.  She is requesting the prescription be changed and sent to [facility].  Patient states that she will be out of medication by tomorrow.

## 2024-12-23 NOTE — TELEPHONE ENCOUNTER
Script for Novolog sent per protocol.    Endocrine refill protocol for rapid acting, regular, intermediate, and mixed insulin:    Protocol Criteria:  PASSED Reason: N/A    If all below requirements are met, send a 90-day supply with 1 refill per provider protocol.    Verify appointment with Endocrinology completed in the last 6 months or scheduled in the next 3 months.  Verify A1C has been completed within the last 6 months and is below 8.5%    -May substitute prescriptions for Novolog and Humalog unless documented allergy (pens and vials) at the same dose and concentration per insurance preference and provider protocol.   -May substitute prescriptions for Novolin R and Humulin R unless documented allergy (pens and vials) at the same dose and concentration per insurance preference and provider protocol.   -May substitute prescriptions for Novolin N and Humulin N unless documented allergy (pens and vials) at the same dose and concentration per insurance preference and provider protocol.   -May substitute prescriptions for Humulin and Novolin 70/30 insulin unless documented allergy at the same dose and concentration per insurance preference and provider protocol.    Last completed office visit: 9/13/2024 Trena Contreras MD   Next scheduled Follow up: No future appointments.   Last A1C result: 8.5% done 9/13/2024.

## 2024-12-23 NOTE — TELEPHONE ENCOUNTER
Current Outpatient Medications   Medication Sig Dispense Refill    insulin aspart (NOVOLOG FLEXPEN) 100 Units/mL Subcutaneous Solution Pen-injector Inject 20 units 3 times daily with meals plus sliding scale. 54 mL 1   Pharmacy message: Alternative requested:this prescribed medication is not covered by insurance.

## 2024-12-23 NOTE — TELEPHONE ENCOUNTER
Current Outpatient Medications   Medication Sig Dispense Refill                                                                   HUMALOG KWIKPEN 100 UNIT/ML Subcutaneous Solution Pen-injector Inject 20 Units into the skin 3 (three) times daily with meals. 15 mL 3   Pharmacy message: Alternative requested: the prescribed medication is not covered by insurance.

## 2024-12-23 NOTE — TELEPHONE ENCOUNTER
Endocrine refill protocol for basal insulins     Protocol Criteria: FAILED Reason: Elevated A1C    If all below requirements are met, send a 90-day supply with 1 refill per provider protocol.       Verify Appointment with Endocrinology completed in the last 6 months or scheduled in the next 3 months.  Verify A1C has been completed within the last 6 months and is below 8.5%     Last completed office visit:9/13/2024 Trena Contreras MD   Next scheduled Follow up: No future appointments.   Last A1c result: Last A1c value was 8.5% done 9/13/2024.

## 2024-12-31 NOTE — TELEPHONE ENCOUNTER
Endo staff,    I do not see an active RX for Mounjaro in order to process PA, please advise.    Thank you,    Faith

## 2025-01-13 DIAGNOSIS — Z79.4 TYPE 2 DIABETES MELLITUS WITH HYPERGLYCEMIA, WITH LONG-TERM CURRENT USE OF INSULIN (HCC): ICD-10-CM

## 2025-01-13 DIAGNOSIS — E11.65 TYPE 2 DIABETES MELLITUS WITH HYPERGLYCEMIA, WITH LONG-TERM CURRENT USE OF INSULIN (HCC): ICD-10-CM

## 2025-01-21 ENCOUNTER — TELEPHONE (OUTPATIENT)
Dept: ENDOCRINOLOGY CLINIC | Facility: CLINIC | Age: 60
End: 2025-01-21

## 2025-01-21 DIAGNOSIS — Z79.4 TYPE 2 DIABETES MELLITUS WITH HYPERGLYCEMIA, WITH LONG-TERM CURRENT USE OF INSULIN (HCC): ICD-10-CM

## 2025-01-21 DIAGNOSIS — E11.65 TYPE 2 DIABETES MELLITUS WITH HYPERGLYCEMIA, WITH LONG-TERM CURRENT USE OF INSULIN (HCC): ICD-10-CM

## 2025-01-21 NOTE — TELEPHONE ENCOUNTER
Patient is requesting to get the prescription for the Contour Next Test Strips sent to the correct pharmacy [facility] in [place] on [address] [facility] [address].

## 2025-01-21 NOTE — TELEPHONE ENCOUNTER
Current Outpatient Medications   Medication Sig Dispense Refill    Glucose Blood (CONTOUR NEXT TEST) In Vitro Strip TEST BLOOD SUGAR THREE TIMES DAILY 300 strip 1   Pharmacy comments: Alternative Requested_ Contour next not covered , need 3 RX new meter,test strips and lancets.

## 2025-01-24 NOTE — TELEPHONE ENCOUNTER
RN spoke to pharmacist and requested to fill prescriptions for diabetes testing supplies preferred brand by insurance.

## 2025-01-24 NOTE — TELEPHONE ENCOUNTER
Patient states that pharmacy is waiting for this office to call regarding an issue with test strips prescription.  Please call.

## 2025-01-29 ENCOUNTER — LAB ENCOUNTER (OUTPATIENT)
Dept: LAB | Facility: HOSPITAL | Age: 60
End: 2025-01-29
Attending: OBSTETRICS & GYNECOLOGY
Payer: COMMERCIAL

## 2025-01-29 DIAGNOSIS — R35.0 URINARY FREQUENCY: ICD-10-CM

## 2025-01-29 LAB
BILIRUB UR QL: NEGATIVE
CLARITY UR: CLEAR
GLUCOSE UR-MCNC: 500 MG/DL
HGB UR QL STRIP.AUTO: NEGATIVE
KETONES UR-MCNC: NEGATIVE MG/DL
LEUKOCYTE ESTERASE UR QL STRIP.AUTO: NEGATIVE
NITRITE UR QL STRIP.AUTO: NEGATIVE
PH UR: 6 [PH] (ref 5–8)
PROT UR-MCNC: NEGATIVE MG/DL
SP GR UR STRIP: 1.03 (ref 1–1.03)
UROBILINOGEN UR STRIP-ACNC: NORMAL

## 2025-01-29 PROCEDURE — 81003 URINALYSIS AUTO W/O SCOPE: CPT

## 2025-01-31 ENCOUNTER — TELEPHONE (OUTPATIENT)
Dept: OBGYN CLINIC | Facility: CLINIC | Age: 60
End: 2025-01-31

## 2025-01-31 NOTE — TELEPHONE ENCOUNTER
Emi notified of negative results.   She states she still has pelvic pressure and urinary frequency. Asking if other recommendations?  To Dr. Lynn

## 2025-01-31 NOTE — TELEPHONE ENCOUNTER
----- Message from Mary Lynn sent at 1/30/2025 10:46 PM CST -----  U/a-normal.  No signs of infection

## 2025-02-05 DIAGNOSIS — I15.2 HYPERTENSION ASSOCIATED WITH DIABETES (HCC): ICD-10-CM

## 2025-02-05 DIAGNOSIS — Z79.4 TYPE 2 DIABETES MELLITUS WITH HYPERGLYCEMIA, WITH LONG-TERM CURRENT USE OF INSULIN (HCC): ICD-10-CM

## 2025-02-05 DIAGNOSIS — E11.59 HYPERTENSION ASSOCIATED WITH DIABETES (HCC): ICD-10-CM

## 2025-02-05 DIAGNOSIS — I25.10 CORONARY ARTERY DISEASE INVOLVING NATIVE CORONARY ARTERY OF NATIVE HEART, UNSPECIFIED WHETHER ANGINA PRESENT: ICD-10-CM

## 2025-02-05 DIAGNOSIS — E07.9 THYROID DISEASE: ICD-10-CM

## 2025-02-05 DIAGNOSIS — E11.65 TYPE 2 DIABETES MELLITUS WITH HYPERGLYCEMIA, WITH LONG-TERM CURRENT USE OF INSULIN (HCC): ICD-10-CM

## 2025-02-05 DIAGNOSIS — E11.21 DIABETIC NEPHROPATHY ASSOCIATED WITH TYPE 2 DIABETES MELLITUS (HCC): ICD-10-CM

## 2025-02-05 DIAGNOSIS — E78.2 MIXED HYPERLIPIDEMIA: ICD-10-CM

## 2025-02-05 DIAGNOSIS — R73.09 HIGH GLUCOSE LEVEL: ICD-10-CM

## 2025-02-05 DIAGNOSIS — E78.00 HYPERCHOLESTEROLEMIA: ICD-10-CM

## 2025-02-05 DIAGNOSIS — E11.42 DIABETIC PERIPHERAL NEUROPATHY (HCC): ICD-10-CM

## 2025-02-05 RX ORDER — INSULIN GLARGINE 100 [IU]/ML
50 INJECTION, SOLUTION SUBCUTANEOUS NIGHTLY
Qty: 15 ML | Refills: 0 | Status: SHIPPED | OUTPATIENT
Start: 2025-02-05

## 2025-02-05 NOTE — TELEPHONE ENCOUNTER
Endocrine refill protocol for basal insulins     Protocol Criteria: FAILED Reason: Elevated A1C    If all below requirements are met, send a 90-day supply with 1 refill per provider protocol.       Verify Appointment with Endocrinology completed in the last 6 months or scheduled in the next 3 months.  Verify A1C has been completed within the last 6 months and is below 8.5%     Last completed office visit:9/13/2024 Trena Contreras MD   Next scheduled Follow up:   Future Appointments   Date Time Provider Department Center   2/7/2025 10:15 [facility]      Last A1c result: Last A1c value was 8.5% done 9/13/2024.

## 2025-02-05 NOTE — TELEPHONE ENCOUNTER
Medication Quantity Refills Start End   BASAGLAR KWIKPEN 100 UNIT/ML Subcutaneous Solution Pen-injector 15 mL 0 11/11/2024 --   Sig:   INJECT 50 UNITS INTO THE SKIN NIGHTLY.     Route:   Subcutaneous     JORGITO:   Yes     Order #:   [id number]         90 DAY

## 2025-02-07 ENCOUNTER — OFFICE VISIT (OUTPATIENT)
Facility: CLINIC | Age: 60
End: 2025-02-07
Payer: COMMERCIAL

## 2025-02-07 ENCOUNTER — TELEPHONE (OUTPATIENT)
Dept: ENDOCRINOLOGY CLINIC | Facility: CLINIC | Age: 60
End: 2025-02-07

## 2025-02-07 VITALS
SYSTOLIC BLOOD PRESSURE: 126 MMHG | DIASTOLIC BLOOD PRESSURE: 66 MMHG | WEIGHT: 205 LBS | BODY MASS INDEX: 32.18 KG/M2 | HEIGHT: 67 IN | HEART RATE: 73 BPM

## 2025-02-07 DIAGNOSIS — E11.21 DIABETIC NEPHROPATHY ASSOCIATED WITH TYPE 2 DIABETES MELLITUS (HCC): ICD-10-CM

## 2025-02-07 DIAGNOSIS — I15.2 HYPERTENSION ASSOCIATED WITH DIABETES (HCC): ICD-10-CM

## 2025-02-07 DIAGNOSIS — E78.2 MIXED HYPERLIPIDEMIA: ICD-10-CM

## 2025-02-07 DIAGNOSIS — R73.09 HIGH GLUCOSE LEVEL: ICD-10-CM

## 2025-02-07 DIAGNOSIS — E11.65 TYPE 2 DIABETES MELLITUS WITH HYPERGLYCEMIA, WITH LONG-TERM CURRENT USE OF INSULIN (HCC): Primary | ICD-10-CM

## 2025-02-07 DIAGNOSIS — Z79.4 TYPE 2 DIABETES MELLITUS WITH HYPERGLYCEMIA, WITH LONG-TERM CURRENT USE OF INSULIN (HCC): Primary | ICD-10-CM

## 2025-02-07 DIAGNOSIS — R79.89 LOW VITAMIN D LEVEL: ICD-10-CM

## 2025-02-07 DIAGNOSIS — E07.9 THYROID DISEASE: ICD-10-CM

## 2025-02-07 DIAGNOSIS — I25.10 CORONARY ARTERY DISEASE INVOLVING NATIVE CORONARY ARTERY OF NATIVE HEART, UNSPECIFIED WHETHER ANGINA PRESENT: ICD-10-CM

## 2025-02-07 DIAGNOSIS — E78.00 HYPERCHOLESTEROLEMIA: ICD-10-CM

## 2025-02-07 DIAGNOSIS — E11.59 HYPERTENSION ASSOCIATED WITH DIABETES (HCC): ICD-10-CM

## 2025-02-07 DIAGNOSIS — E11.42 DIABETIC PERIPHERAL NEUROPATHY (HCC): ICD-10-CM

## 2025-02-07 DIAGNOSIS — Z86.39: ICD-10-CM

## 2025-02-07 LAB
GLUCOSE BLOOD: 270
HEMOGLOBIN A1C: 8.8 % (ref 4.3–5.6)
TEST STRIP LOT #: NORMAL NUMERIC

## 2025-02-07 RX ORDER — INSULIN ASPART 100 [IU]/ML
25 INJECTION, SOLUTION INTRAVENOUS; SUBCUTANEOUS
Qty: 75 ML | Refills: 0 | Status: SHIPPED | OUTPATIENT
Start: 2025-02-07

## 2025-02-07 RX ORDER — SEMAGLUTIDE 0.68 MG/ML
0.25 INJECTION, SOLUTION SUBCUTANEOUS WEEKLY
Qty: 9 ML | Refills: 0 | Status: SHIPPED | OUTPATIENT
Start: 2025-02-07

## 2025-02-07 RX ORDER — INSULIN GLARGINE 100 [IU]/ML
50 INJECTION, SOLUTION SUBCUTANEOUS NIGHTLY
Qty: 30 ML | Refills: 1 | Status: SHIPPED | OUTPATIENT
Start: 2025-02-07

## 2025-02-07 NOTE — TELEPHONE ENCOUNTER
Medication Quantity Refills Start End   semaglutide (OZEMPIC, 0.25 OR 0.5 MG/DOSE,) 2 MG/3ML Subcutaneous Solution Pen-injector 9 mL 0 2/7/2025 --   Sig:   Inject 0.25 mg into the skin once a week.     Route:   Subcutaneous     Order #:   [id number]         Prior Authorization Needed via CoverMyMeds    KEY: [id number]

## 2025-02-07 NOTE — PROGRESS NOTES
Follow up Visit:  DM   Requesting Physician:   .Talia Pierce MD      CHIEF COMPLAINT:    Chief Complaint   Patient presents with    Diabetes     F/u        HISTORY OF PRESENT ILLNESS:   Emi Montejo is a 59 year old female who presents with DM    Saw 2 endocrinology before but was managed by PCP   at home and was higher before   Walking x3/week    She is working out now and trying to lose wt     Stopped losartan d/t side effect.  Did not tolerate mounjaro  Had GI SE from metformin   Tried ozempic in the past x2 but had SE from it.  Was started on Januvia recently ~ 10/2023 and BG is better.  But had SE From it also   Does not like medications and prefers to take less meds    DM t2 with HTN, DLP, diabetic retinopathy and CAD s/p PCI mid LAD 95% stenosis with CINTHYA x 1 4/25/2023   worsening A1c 9.5  9/2023 from 8.2 6/2023    t2DM  Dx when was pregnant with her daughter ~1991. Was dx with DM in 1995  Started insulin ~ 10 yrs ago     Now takes   Basaglar 40 to 50 units at night   Humalog  6 to 18 units, before meals    No hx of MTC or pancreatitis   Yeast/UTI was in 2023. Gets them once a year    .DM quality measures:  A1C/Blood pressure: as reported above   Nephropathy screening:   continue ace /arb rx.   LIPID screening:  On lipitor statin rx.   Last dilated eye exam: No data recorded 6/2024   Exam shows retinopathy? No data recorded no  Last diabetic foot exam: No data recorded    Dentist : recommend every 6m    She is a  - has arthritis in the hands     Has periMP Sx now        Cholesterol Meds: atorvastatin Tabs - 40 MG   Cholesterol: 116, done on 9/15/2024.  HDL Cholesterol: 41, done on 9/15/2024.  LDL Cholesterol: 59, done on 9/15/2024.  TriGlycerides 78, done on 9/15/2024.  Micro Albumen/Creatinine:    Lab Results   Component Value Date    MICROALBCREA  09/20/2024      Comment:      Unable to calculate due to Urine Microalbumin <0.3 mg/dL        MICROALBCREA  08/09/2021       Comment:      Unable to calculate due to Urine Microalbumin <1.2 mg/dL     MICROALBCREA 6.1 05/15/2019       ASSESSMENT AND PLAN:  59 year old uncontrolled complicated DM w/ micro and macrovascular complications  S/p  CAD LAD PCI with microvascular complications/  neuropathy  On MDI and statin      Eye exam 2024  Foot exam 2024- abnormal monofilament     Did not tolerate other meds ( MTF, GLP_1, GIP/GLP, DPP4i)   Will avoid mounjaro d/t GI SE   We discussed her target again   BG is high   Plan    Fasting labs   RTC in 3 mo     Would like to try low dose ozempic 0.25 mg/wk since she did not tolerate mounjaro     Target BG   BEFORE MEAL 100-130mg/dl  2hrs AFTER MEAL less than 180mg/dl    Basaglar 45 units at night     Novolog   15 units with regular meals   20  units with  larger meals    10 units with smaller meals.     If Blood sugar is high, take correction dose insulin using short acting insulin (any of the following is fine) Humalog/Novolog/Fiasp/Lyumjev    176 - 200 mg/dL= 1 unit  201 - 225 mg/dL= 2 units  226 - 250 mg/dL= 3 unit  251 - 300 mg/dL= 4 units  301 - 350 mg/dL= 6 units  greater than 350 mg/dL = 8 unit     Meal-time INSULIN: (any of the following is fine)  Novolog/Humalog/Fiasp/Lyumjev  Take 20 units With large meals. +  correction dose if  BG is high  Take 15 units With usual meals + correction dose if  BG is high     2025  b a1c: 8.8          PAST MEDICAL HISTORY:   Past Medical History:    ANEMIA    History of blood transfusion    HTN (hypertension)    Hypercholesterolemia    Mild nonproliferative diabetic retinopathy without macular edema associated with type 2 diabetes mellitus (HCC)    Type II or unspecified type diabetes mellitus without mention of complication, not stated as uncontrolled       PAST SURGICAL HISTORY:   Past Surgical History:   Procedure Laterality Date          Colonoscopy  6/15    hemorrhoids; repeat 10 yrs    Colonoscopy,diagnostic N/A 6/3/2015     Procedure: COLONOSCOPY, POSSIBLE BIOPSY, POSSIBLE POLYPECTOMY 13092;  Surgeon: Carlo Sullivan MD;  Location: [facility]    D & c      Exc skin benig 2.1-3cm trunk,arm,leg Left 4/19/2016    Procedure: EXCISION OF GROIN MASS;  Surgeon: George Mao MD;  Location: [facility]    Hysterectomy      done for fibroids (Clasen); ovaries left    Layr clos wnd trunk,arm,leg <2.5cm Left 4/19/2016    Procedure: EXCISION OF GROIN MASS;  Surgeon: George Mao MD;  Location: [facility]       CURRENT MEDICATIONS:    Current Outpatient Medications   Medication Sig Dispense Refill    insulin glargine (BASAGLAR KWIKPEN) 100 UNIT/ML Subcutaneous Solution Pen-injector Inject 50 Units into the skin nightly. 30 mL 1    semaglutide (OZEMPIC, 0.25 OR 0.5 MG/DOSE,) 2 MG/3ML Subcutaneous Solution Pen-injector Inject 0.25 mg into the skin once a week. 9 mL 0    insulin aspart (NOVOLOG FLEXPEN) 100 Units/mL Subcutaneous Solution Pen-injector Inject 25 Units into the skin 3 (three) times daily before meals. Inject 20 units 3 times daily with meals plus sliding scale. 75 mL 0    BASAGLAR KWIKPEN 100 UNIT/ML Subcutaneous Solution Pen-injector Inject 50 Units into the skin nightly. 15 mL 0    atorvastatin 40 MG Oral Tab Take 1 tablet (40 mg total) by mouth nightly. 90 tablet 3    amLODIPine 2.5 MG Oral Tab Take 1 tablet (2.5 mg total) by mouth daily.      aspirin 81 MG Oral Tab EC Take 1 tablet (81 mg total) by mouth daily.      Blood Glucose Monitoring Suppl Does not apply Misc TEST BLOOD SUGAR THREE TIMES DAILY 300 each 1    Blood Glucose Monitoring Suppl w/Device Does not apply Kit TEST BLOOD SUGAR THREE TIMES DAILY 1 kit 1    Blood Glucose Monitoring Suppl Does not apply Misc TEST BLOOD SUGAR THREE TIMES DAILY 300 each 1    Glucose Blood (CONTOUR NEXT TEST) In Vitro Strip TEST BLOOD SUGAR THREE TIMES DAILY 300 strip 1    BD PEN NEEDLE ANASTASIA U/F 32G X 4 MM Does not apply Misc Use twice daily  with Lantus & Novolog insulin pens 100 each 6    Continuous Glucose Sensor (DEXCOM G7 SENSOR) Does not apply Misc 1 each Every 10 days. Use as directed every 10 days 9 each 1    Continuous Glucose  (DEXCOM G7 ) Does not apply Device 1 each As Directed. 1 each 0    Continuous Glucose Sensor (FREESTYLE MESFIN 3 PLUS SENSOR) Does not apply Misc 1 each every 14 (fourteen) days. To check glucose 6 each 0    Insulin Pen Needle (BD PEN NEEDLE ANASTASIA U/F) 32G X 4 MM Does not apply Misc 1 strip by In Vitro route 4 (four) times daily. 400 each 1    ACCU-CHEK ERIS PLUS In Vitro Strip USE TO TEST BLOOD SUGAR THREE TIMES DAILY AS DIRECTED 300 strip 3    Lancets (ASCENSIA MICROLET) Does not apply Misc Test 3 times daily 100 Each 11       ALLERGIES:  No Known Allergies    SOCIAL HISTORY:    Social History     Socioeconomic History    Marital status:    Occupational History    Occupation: cosmotologist   Tobacco Use    Smoking status: Never    Smokeless tobacco: Never   Vaping Use    Vaping status: Never Used   Substance and Sexual Activity    Alcohol use: Not Currently    Drug use: No    Sexual activity: Yes     Partners: Male     Birth control/protection: Hysterectomy       FAMILY HISTORY:   Family History   Problem Relation Age of Onset    Diabetes Maternal Grandmother     Eye Problems Maternal Grandmother         cataracts/iol    Diabetes Other         aunt    Breast Cancer Maternal Aunt 60        age 60      Daughter with t2DM seeing Dr Ralph       PHYSICAL EXAM:   Height: 5' 7\" (170.2 cm) (02/07 1011)  Weight: 205 lb (93 kg) (02/07 1011)  BSA (Calculated - sq m): 2.04 sq meters (02/07 1011)  Pulse: 73 (02/07 1011)  BP: 126/66 (02/07 1011)  Temp: --  Do Not Use - Resp Rate: --  SpO2: --     No goiter     DATA:     Pertinent data reviewed    Orders Placed This Encounter   Procedures    POC HemoCue Glucose 201 (Finger stick glucose)    POC Glycohemoglobin [03672]    Hemoglobin A1C    Lipid Panel     Microalb/Creat Ratio, Random Urine    TSH W Reflex To Free T4    Comp Metabolic Panel (14)              Lab Results   Component Value Date    A1C 8.8 (A) 02/07/2025    A1C 8.5 (A) 09/13/2024    A1C 9.3 (A) 12/04/2023    A1C 6.6 (H) 08/09/2021    A1C 5.8 (A) 07/06/2020      Cholesterol: 116, done on 9/15/2024.  HDL Cholesterol: 41, done on 9/15/2024.  LDL Cholesterol: 59, done on 9/15/2024.  TriGlycerides 78, done on 9/15/2024.             No results for input(s): \"TSH\", \"T4F\", \"T3F\", \"THYP\" in the last 72 hours.  No results found.  POC HemoCue Glucose 201 (Finger stick glucose)        Component Value Flag Ref Range Units Status    GLUCOSE BLOOD 270        Final    Test Strip Lot # [id number]       Numeric Final    Test Strip Expiration Date 6/20/25       Date Final                  POC Glycohemoglobin [64042]        Component Value Flag Ref Range Units Status    HEMOGLOBIN A1C 8.8      4.3 - 5.6 % Final    Cartridge Lot# [id number]       Numeric Final    Cartridge Expiration Date 10/04/26       Date Final                    Orders Placed This Encounter   Procedures    POC HemoCue Glucose 201 (Finger stick glucose)    POC Glycohemoglobin [[id number]]    Hemoglobin A1C    Lipid Panel    Microalb/Creat Ratio, Random Urine    TSH W Reflex To Free T4    Comp Metabolic Panel (14)     Orders Placed This Encounter    POC HemoCue Glucose 201 (Finger stick glucose)     Order Specific Question:   Release to patient     Answer:   Immediate    POC Glycohemoglobin [23513]     Order Specific Question:   Release to patient     Answer:   Immediate    Hemoglobin A1C     Standing Status:   Future     Standing Expiration Date:   2/7/2026     Order Specific Question:   Release to patient     Answer:   Immediate    Lipid Panel     Standing Status:   Future     Standing Expiration Date:   2/7/2026     Order Specific Question:   Release to patient     Answer:   Immediate    Microalb/Creat Ratio, Random Urine     Standing Status:   Future      Standing Expiration Date:   2/7/2026     Order Specific Question:   Release to patient     Answer:   Immediate    TSH W Reflex To Free T4     Standing Status:   Future     Standing Expiration Date:   2/7/2026     Order Specific Question:   Release to patient     Answer:   Immediate    Comp Metabolic Panel (14)     Standing Status:   Future     Standing Expiration Date:   2/7/2026     Order Specific Question:   Release to patient     Answer:   Immediate    insulin glargine (BASAGLAR KWIKPEN) 100 UNIT/ML Subcutaneous Solution Pen-injector     Sig: Inject 50 Units into the skin nightly.     Dispense:  30 mL     Refill:  1    semaglutide (OZEMPIC, 0.25 OR 0.5 MG/DOSE,) 2 MG/3ML Subcutaneous Solution Pen-injector     Sig: Inject 0.25 mg into the skin once a week.     Dispense:  9 mL     Refill:  0    insulin aspart (NOVOLOG FLEXPEN) 100 Units/mL Subcutaneous Solution Pen-injector     Sig: Inject 25 Units into the skin 3 (three) times daily before meals. Inject 20 units 3 times daily with meals plus sliding scale.     Dispense:  75 mL     Refill:  0     Patient will also need insulin pen needles      TSH   Date Value Ref Range Status   09/15/2024 2.298 0.550 - 4.780 mIU/mL Final   08/09/2021 2.620 0.270 - 4.200 mIU/L Final   05/09/2011 2.280 0.450 - 4.500 uIU/mL Final      This is a specialized patient consultation in endocrinology and required comprehensive review of prior records, as well as current evaluation, with time required for consideration of complex endocrine issues and consultation. For this visit, I personally interviewed the patient, and family member if accompanied, performed the pertinent parts of the history and physical examination. ROS included screening for appropriate endocrine conditions.   Today's diagnosis and plan were reviewed in detail with the patient who states understanding and agrees with plan. I discussed with the patient possible diagnosis, differential diagnosis, need for work up,  treatment options, alternatives and side effects.     Please see note for details about time spent which includes:   · pre-visit preparation  · reviewing records  · face to face time with the patient   · timely documentation of the encounter  · ordering medications/tests  · communication with care team  · care coordination    I appreciate the opportunity to be part of your patient's medical care and will keep you, as the referring and primary physicians, informed about the care of your patient. Please feel free to contact me should you have any questions.      The 21st Century Cures Act makes medical notes like these available to patients in the interest of transparency. Please be advised this is a medical document. Medical documents are intended to carry relevant information, facts as evident, and the clinical opinion of the practitioner. The medical note is intended as peer to peer communication and may appear blunt or direct. It is written in medical language and may contain abbreviations or verbiage that are unfamiliar.     Trena Contreras MD

## 2025-02-07 NOTE — PATIENT INSTRUCTIONS
Fasting labs   RTC in 3 mo     Would like to try low dose ozempic 0.25 mg/wk since she did not tolerate mounjaro     Target BG   BEFORE MEAL 100-130mg/dl  2hrs AFTER MEAL less than 180mg/dl    Basaglar 45 units at night     Novolog   15 units with regular meals   20  units with  larger meals    10 units with smaller meals.     If Blood sugar is high, take correction dose insulin using short acting insulin (any of the following is fine) Humalog/Novolog/Fiasp/Lyumjev    176 - 200 mg/dL= 1 unit  201 - 225 mg/dL= 2 units  226 - 250 mg/dL= 3 unit  251 - 300 mg/dL= 4 units  301 - 350 mg/dL= 6 units  greater than 350 mg/dL = 8 unit     Meal-time INSULIN: (any of the following is fine)  Novolog/Humalog/Fiasp/Lyumjev  Take 20 units With large meals. +  correction dose if  BG is high  Take 15 units With usual meals + correction dose if  BG is high     2025  b a1c: 8.8    Lab Results   Component Value Date    A1C 8.5 (A) 2024    A1C 9.3 (A) 2023    A1C 6.6 (H) 2021    A1C 5.8 (A) 2020    A1C 6.1 (A) 2020        Wt Readings from Last 6 Encounters:   25 205 lb (93 kg)   10/23/24 201 lb (91.2 kg)   24 201 lb (91.2 kg)   24 196 lb (88.9 kg)   23 202 lb (91.6 kg)   23 199 lb 6.4 oz (90.4 kg)

## 2025-02-10 NOTE — TELEPHONE ENCOUNTER
Medication PA Requested: semaglutide (OZEMPIC, 0.25 OR 0.5 MG/DOSE,) 2 MG/3ML Subcutaneous Solution Pen-injector                                                          CoverMyMeds Used: yes  Key:[id number]   Quantity:9mL  Day Supply: 90  Sig: Inject 0.25 mg into the skin once a week.   DX Code:   E11.65

## 2025-02-12 ENCOUNTER — TELEPHONE (OUTPATIENT)
Dept: ENDOCRINOLOGY CLINIC | Facility: CLINIC | Age: 60
End: 2025-02-12

## 2025-02-12 NOTE — TELEPHONE ENCOUNTER
Patient received message from "[employer]/[facility] requesting more information for script. Please contact pharmacy and update by calling at [phone], ok to leave message, thanks.

## 2025-02-12 NOTE — TELEPHONE ENCOUNTER
Current Outpatient Medications   Medication Sig Dispense Refill                  insulin aspart (NOVOLOG FLEXPEN) 100 Units/mL Subcutaneous Solution Pen-injector Inject 25 Units into the skin 3 (three) times daily before meals. Inject 20 units 3 times daily with meals plus sliding scale. 75 mL 0                 Pharmacy message: Script clarification:Please verify the directions... Is it inject 25 units or 25 units?   Render Post-Care Instructions In Note?: no Post-Care Instructions: I reviewed with the patient in detail post-care instructions. Patient is to keep the treatment areas dry overnight, and then apply bacitracin twice daily until healed. Patient may apply hydrogen peroxide soaks to remove any crusting. Detail Level: Simple Consent was obtained and risks were reviewed including but not limited to scarring, infection, bleeding, scabbing, incomplete removal, and allergy to anesthesia. Extraction Method: 11 blade and comedo extractor Prep Text (Optional): Prior to removal the treatment areas were prepped in the usual fashion. Render Number Of Lesions Treated: yes Acne Type: Comedonal Lesions

## 2025-02-13 ENCOUNTER — PATIENT MESSAGE (OUTPATIENT)
Facility: CLINIC | Age: 60
End: 2025-02-13

## 2025-02-13 NOTE — TELEPHONE ENCOUNTER
Called patients pharmacy [facility] at [phone] and spoke to pharmacist, script was updated and no further action was needed

## 2025-02-20 NOTE — TELEPHONE ENCOUNTER
Received fax from [employer] in regards to Ozempic. Medication has been approved from 02/14/2025 to 02/14/2026. [employer] message sent to pt and approval letter sent to scanning.

## 2025-02-24 ENCOUNTER — LAB ENCOUNTER (OUTPATIENT)
Dept: LAB | Facility: HOSPITAL | Age: 60
End: 2025-02-24
Attending: INTERNAL MEDICINE
Payer: COMMERCIAL

## 2025-03-30 ENCOUNTER — LAB ENCOUNTER (OUTPATIENT)
Dept: LAB | Facility: HOSPITAL | Age: 60
End: 2025-03-30
Attending: INTERNAL MEDICINE
Payer: COMMERCIAL

## 2025-03-30 DIAGNOSIS — Z86.39: ICD-10-CM

## 2025-03-30 DIAGNOSIS — I25.10 CORONARY ARTERY DISEASE INVOLVING NATIVE CORONARY ARTERY OF NATIVE HEART, UNSPECIFIED WHETHER ANGINA PRESENT: ICD-10-CM

## 2025-03-30 DIAGNOSIS — Z79.4 TYPE 2 DIABETES MELLITUS WITH HYPERGLYCEMIA, WITH LONG-TERM CURRENT USE OF INSULIN (HCC): ICD-10-CM

## 2025-03-30 DIAGNOSIS — E11.21 DIABETIC NEPHROPATHY ASSOCIATED WITH TYPE 2 DIABETES MELLITUS (HCC): ICD-10-CM

## 2025-03-30 DIAGNOSIS — E11.65 TYPE 2 DIABETES MELLITUS WITH HYPERGLYCEMIA, WITH LONG-TERM CURRENT USE OF INSULIN (HCC): ICD-10-CM

## 2025-03-30 DIAGNOSIS — E78.00 HYPERCHOLESTEROLEMIA: ICD-10-CM

## 2025-03-30 DIAGNOSIS — E11.42 DIABETIC PERIPHERAL NEUROPATHY (HCC): ICD-10-CM

## 2025-03-30 DIAGNOSIS — R79.89 LOW VITAMIN D LEVEL: ICD-10-CM

## 2025-03-30 DIAGNOSIS — E07.9 THYROID DISEASE: ICD-10-CM

## 2025-03-30 DIAGNOSIS — E78.2 MIXED HYPERLIPIDEMIA: ICD-10-CM

## 2025-03-30 DIAGNOSIS — E11.59 HYPERTENSION ASSOCIATED WITH DIABETES (HCC): ICD-10-CM

## 2025-03-30 DIAGNOSIS — R73.09 HIGH GLUCOSE LEVEL: ICD-10-CM

## 2025-03-30 DIAGNOSIS — I15.2 HYPERTENSION ASSOCIATED WITH DIABETES (HCC): ICD-10-CM

## 2025-03-30 LAB
ALBUMIN SERPL-MCNC: 4.3 G/DL (ref 3.2–4.8)
ALBUMIN/GLOB SERPL: 2.2 {RATIO} (ref 1–2)
ALP LIVER SERPL-CCNC: 59 U/L
ALT SERPL-CCNC: 28 U/L
ANION GAP SERPL CALC-SCNC: 7 MMOL/L (ref 0–18)
AST SERPL-CCNC: 20 U/L (ref ?–34)
BILIRUB SERPL-MCNC: 0.6 MG/DL (ref 0.3–1.2)
BUN BLD-MCNC: 13 MG/DL (ref 9–23)
BUN/CREAT SERPL: 17.8 (ref 10–20)
CALCIUM BLD-MCNC: 8.9 MG/DL (ref 8.7–10.4)
CHLORIDE SERPL-SCNC: 109 MMOL/L (ref 98–112)
CHOLEST SERPL-MCNC: 113 MG/DL (ref ?–200)
CO2 SERPL-SCNC: 27 MMOL/L (ref 21–32)
CREAT BLD-MCNC: 0.73 MG/DL
EGFRCR SERPLBLD CKD-EPI 2021: 95 ML/MIN/1.73M2 (ref 60–?)
EST. AVERAGE GLUCOSE BLD GHB EST-MCNC: 186 MG/DL (ref 68–126)
FASTING PATIENT LIPID ANSWER: YES
FASTING STATUS PATIENT QL REPORTED: YES
GLOBULIN PLAS-MCNC: 2 G/DL (ref 2–3.5)
GLUCOSE BLD-MCNC: 227 MG/DL (ref 70–99)
HBA1C MFR BLD: 8.1 % (ref ?–5.7)
HDLC SERPL-MCNC: 46 MG/DL (ref 40–59)
LDLC SERPL CALC-MCNC: 54 MG/DL (ref ?–100)
NONHDLC SERPL-MCNC: 67 MG/DL (ref ?–130)
OSMOLALITY SERPL CALC.SUM OF ELEC: 303 MOSM/KG (ref 275–295)
POTASSIUM SERPL-SCNC: 4.3 MMOL/L (ref 3.5–5.1)
PROT SERPL-MCNC: 6.3 G/DL (ref 5.7–8.2)
SODIUM SERPL-SCNC: 143 MMOL/L (ref 136–145)
TRIGL SERPL-MCNC: 60 MG/DL (ref 30–149)
TSI SER-ACNC: 2.32 UIU/ML (ref 0.55–4.78)
VLDLC SERPL CALC-MCNC: 9 MG/DL (ref 0–30)

## 2025-03-30 PROCEDURE — 83036 HEMOGLOBIN GLYCOSYLATED A1C: CPT

## 2025-03-30 PROCEDURE — 36415 COLL VENOUS BLD VENIPUNCTURE: CPT

## 2025-03-30 PROCEDURE — 80053 COMPREHEN METABOLIC PANEL: CPT

## 2025-03-30 PROCEDURE — 84443 ASSAY THYROID STIM HORMONE: CPT

## 2025-03-30 PROCEDURE — 80061 LIPID PANEL: CPT

## 2025-03-31 ENCOUNTER — LAB ENCOUNTER (OUTPATIENT)
Dept: LAB | Age: 60
End: 2025-03-31
Attending: INTERNAL MEDICINE
Payer: COMMERCIAL

## 2025-03-31 DIAGNOSIS — Z86.39: ICD-10-CM

## 2025-03-31 DIAGNOSIS — R73.09 HIGH GLUCOSE LEVEL: ICD-10-CM

## 2025-03-31 DIAGNOSIS — Z79.4 TYPE 2 DIABETES MELLITUS WITH HYPERGLYCEMIA, WITH LONG-TERM CURRENT USE OF INSULIN (HCC): ICD-10-CM

## 2025-03-31 DIAGNOSIS — E07.9 THYROID DISEASE: ICD-10-CM

## 2025-03-31 DIAGNOSIS — E11.59 HYPERTENSION ASSOCIATED WITH DIABETES (HCC): ICD-10-CM

## 2025-03-31 DIAGNOSIS — R79.89 LOW VITAMIN D LEVEL: ICD-10-CM

## 2025-03-31 DIAGNOSIS — E11.42 DIABETIC PERIPHERAL NEUROPATHY (HCC): ICD-10-CM

## 2025-03-31 DIAGNOSIS — E78.00 HYPERCHOLESTEROLEMIA: ICD-10-CM

## 2025-03-31 DIAGNOSIS — I15.2 HYPERTENSION ASSOCIATED WITH DIABETES (HCC): ICD-10-CM

## 2025-03-31 DIAGNOSIS — I25.10 CORONARY ARTERY DISEASE INVOLVING NATIVE CORONARY ARTERY OF NATIVE HEART, UNSPECIFIED WHETHER ANGINA PRESENT: ICD-10-CM

## 2025-03-31 DIAGNOSIS — E11.65 TYPE 2 DIABETES MELLITUS WITH HYPERGLYCEMIA, WITH LONG-TERM CURRENT USE OF INSULIN (HCC): ICD-10-CM

## 2025-03-31 DIAGNOSIS — E78.2 MIXED HYPERLIPIDEMIA: ICD-10-CM

## 2025-03-31 DIAGNOSIS — E11.21 DIABETIC NEPHROPATHY ASSOCIATED WITH TYPE 2 DIABETES MELLITUS (HCC): ICD-10-CM

## 2025-03-31 LAB
CREAT UR-SCNC: 91.1 MG/DL
MICROALBUMIN UR-MCNC: <0.3 MG/DL

## 2025-03-31 PROCEDURE — 82043 UR ALBUMIN QUANTITATIVE: CPT

## 2025-03-31 PROCEDURE — 82570 ASSAY OF URINE CREATININE: CPT

## 2025-04-14 ENCOUNTER — PATIENT MESSAGE (OUTPATIENT)
Facility: CLINIC | Age: 60
End: 2025-04-14

## 2025-05-01 DIAGNOSIS — E11.21 DIABETIC NEPHROPATHY ASSOCIATED WITH TYPE 2 DIABETES MELLITUS (HCC): ICD-10-CM

## 2025-05-01 DIAGNOSIS — E78.00 HYPERCHOLESTEROLEMIA: ICD-10-CM

## 2025-05-01 DIAGNOSIS — E78.2 MIXED HYPERLIPIDEMIA: ICD-10-CM

## 2025-05-01 DIAGNOSIS — Z79.4 TYPE 2 DIABETES MELLITUS WITH HYPERGLYCEMIA, WITH LONG-TERM CURRENT USE OF INSULIN (HCC): ICD-10-CM

## 2025-05-01 DIAGNOSIS — I15.2 HYPERTENSION ASSOCIATED WITH DIABETES (HCC): ICD-10-CM

## 2025-05-01 DIAGNOSIS — E11.65 TYPE 2 DIABETES MELLITUS WITH HYPERGLYCEMIA, WITH LONG-TERM CURRENT USE OF INSULIN (HCC): ICD-10-CM

## 2025-05-01 DIAGNOSIS — E11.42 DIABETIC PERIPHERAL NEUROPATHY (HCC): ICD-10-CM

## 2025-05-01 DIAGNOSIS — I25.10 CORONARY ARTERY DISEASE INVOLVING NATIVE CORONARY ARTERY OF NATIVE HEART, UNSPECIFIED WHETHER ANGINA PRESENT: ICD-10-CM

## 2025-05-01 DIAGNOSIS — E11.59 HYPERTENSION ASSOCIATED WITH DIABETES (HCC): ICD-10-CM

## 2025-05-01 DIAGNOSIS — R73.09 HIGH GLUCOSE LEVEL: ICD-10-CM

## 2025-05-01 DIAGNOSIS — E07.9 THYROID DISEASE: ICD-10-CM

## 2025-05-01 RX ORDER — INSULIN GLARGINE 100 [IU]/ML
50 INJECTION, SOLUTION SUBCUTANEOUS NIGHTLY
Qty: 30 ML | Refills: 1 | Status: SHIPPED | OUTPATIENT
Start: 2025-05-01

## 2025-05-01 NOTE — TELEPHONE ENCOUNTER
Endocrine refill protocol for basal insulins     Protocol Criteria: PASSED     If all below requirements are met, send a 90-day supply with 1 refill per provider protocol.       Verify Appointment with Endocrinology completed in the last 6 months or scheduled in the next 3 months.  Verify A1C has been completed within the last 6 months and is below 8.5%     Last completed office visit:2/7/2025 Trena Contreras MD     Next scheduled Follow up: No future appointments. - [employer] message sent     Last A1c result: Last A1c value was 8.1% done 3/30/2025.

## 2025-06-23 DIAGNOSIS — E78.00 HYPERCHOLESTEROLEMIA: ICD-10-CM

## 2025-06-23 DIAGNOSIS — E78.2 MIXED HYPERLIPIDEMIA: ICD-10-CM

## 2025-06-23 DIAGNOSIS — I15.2 HYPERTENSION ASSOCIATED WITH DIABETES (HCC): ICD-10-CM

## 2025-06-23 DIAGNOSIS — E11.21 DIABETIC NEPHROPATHY ASSOCIATED WITH TYPE 2 DIABETES MELLITUS (HCC): ICD-10-CM

## 2025-06-23 DIAGNOSIS — E11.65 TYPE 2 DIABETES MELLITUS WITH HYPERGLYCEMIA, WITH LONG-TERM CURRENT USE OF INSULIN (HCC): ICD-10-CM

## 2025-06-23 DIAGNOSIS — R73.09 HIGH GLUCOSE LEVEL: ICD-10-CM

## 2025-06-23 DIAGNOSIS — I25.10 CORONARY ARTERY DISEASE INVOLVING NATIVE CORONARY ARTERY OF NATIVE HEART, UNSPECIFIED WHETHER ANGINA PRESENT: ICD-10-CM

## 2025-06-23 DIAGNOSIS — E11.42 DIABETIC PERIPHERAL NEUROPATHY (HCC): ICD-10-CM

## 2025-06-23 DIAGNOSIS — E11.59 HYPERTENSION ASSOCIATED WITH DIABETES (HCC): ICD-10-CM

## 2025-06-23 DIAGNOSIS — E07.9 THYROID DISEASE: ICD-10-CM

## 2025-06-23 DIAGNOSIS — Z79.4 TYPE 2 DIABETES MELLITUS WITH HYPERGLYCEMIA, WITH LONG-TERM CURRENT USE OF INSULIN (HCC): ICD-10-CM

## 2025-06-23 RX ORDER — INSULIN GLARGINE 100 [IU]/ML
50 INJECTION, SOLUTION SUBCUTANEOUS NIGHTLY
Qty: 45 ML | Refills: 1 | Status: SHIPPED | OUTPATIENT
Start: 2025-06-23

## 2025-06-23 NOTE — TELEPHONE ENCOUNTER
Medication Quantity Refills Start End   BASAGLAR KWIKPEN 100 UNIT/ML Subcutaneous Solution Pen-injector 30 mL 1 5/1/2025 --   Sig:   INJECT 50 UNITS INTO THE SKIN NIGHTLY.     Route:   Subcutaneous     JORGITO:   Yes       90 Day Prescription Request

## 2025-06-23 NOTE — TELEPHONE ENCOUNTER
Endocrine refill protocol for basal insulins     Protocol Criteria: PASSED Reason: N/A    If all below requirements are met, send a 90-day supply with 1 refill per provider protocol.       Verify Appointment with Endocrinology completed in the last 6 months or scheduled in the next 3 months.  Verify A1C has been completed within the last 6 months and is below 8.5%     Last completed office visit:2/7/2025 Trena Contreras MD   Last completed telemed visit: Visit date not found  Next scheduled Follow up: No future appointments.   Last A1c result: Last A1c value was 8.1% done 3/30/2025.

## 2025-07-23 DIAGNOSIS — R73.09 HIGH GLUCOSE LEVEL: ICD-10-CM

## 2025-07-23 DIAGNOSIS — E07.9 THYROID DISEASE: ICD-10-CM

## 2025-07-23 DIAGNOSIS — E78.00 HYPERCHOLESTEROLEMIA: ICD-10-CM

## 2025-07-23 DIAGNOSIS — E78.2 MIXED HYPERLIPIDEMIA: ICD-10-CM

## 2025-07-23 DIAGNOSIS — E11.21 DIABETIC NEPHROPATHY ASSOCIATED WITH TYPE 2 DIABETES MELLITUS (HCC): ICD-10-CM

## 2025-07-23 DIAGNOSIS — E11.65 TYPE 2 DIABETES MELLITUS WITH HYPERGLYCEMIA, WITH LONG-TERM CURRENT USE OF INSULIN (HCC): ICD-10-CM

## 2025-07-23 DIAGNOSIS — Z86.39: ICD-10-CM

## 2025-07-23 DIAGNOSIS — E11.42 DIABETIC PERIPHERAL NEUROPATHY (HCC): ICD-10-CM

## 2025-07-23 DIAGNOSIS — R79.89 LOW VITAMIN D LEVEL: ICD-10-CM

## 2025-07-23 DIAGNOSIS — I25.10 CORONARY ARTERY DISEASE INVOLVING NATIVE CORONARY ARTERY OF NATIVE HEART, UNSPECIFIED WHETHER ANGINA PRESENT: ICD-10-CM

## 2025-07-23 DIAGNOSIS — Z79.4 TYPE 2 DIABETES MELLITUS WITH HYPERGLYCEMIA, WITH LONG-TERM CURRENT USE OF INSULIN (HCC): ICD-10-CM

## 2025-07-23 DIAGNOSIS — E11.59 HYPERTENSION ASSOCIATED WITH DIABETES (HCC): ICD-10-CM

## 2025-07-23 DIAGNOSIS — I15.2 HYPERTENSION ASSOCIATED WITH DIABETES (HCC): ICD-10-CM

## 2025-07-25 RX ORDER — BLOOD SUGAR DIAGNOSTIC
STRIP MISCELLANEOUS 3 TIMES DAILY
Qty: 300 STRIP | Refills: 1 | Status: SHIPPED | OUTPATIENT
Start: 2025-07-25

## 2025-07-25 RX ORDER — INSULIN ASPART 100 [IU]/ML
INJECTION, SOLUTION INTRAVENOUS; SUBCUTANEOUS
Qty: 54 ML | Refills: 0 | Status: SHIPPED | OUTPATIENT
Start: 2025-07-25

## 2025-07-25 NOTE — TELEPHONE ENCOUNTER
Endocrine refill protocol for basal insulins     Protocol Criteria: PASSED Reason: N/A    If all below requirements are met, send a 90-day supply with 1 refill per provider protocol.       Verify Appointment with Endocrinology completed in the last 6 months or scheduled in the next 3 months.  Verify A1C has been completed within the last 6 months and is below 8.5%     Last completed office visit:2/7/2025 Trena Contreras MD   Last completed telemed visit: Visit date not found  Next scheduled Follow up:   Future Appointments   Date Time Provider Department Center   10/24/2025  2:30 PM Trena Contreras MD [facility]      Last A1c result: Last A1c value was 8.1% done 3/30/2025.

## (undated) DEVICE — 3M™ RED DOT™ MONITORING ELECTRODE WITH FOAM TAPE AND STICKY GEL, 50/BAG, 20/CASE, 72/PLT 2570: Brand: RED DOT™

## (undated) DEVICE — 1200CC GUARDIAN II: Brand: GUARDIAN

## (undated) DEVICE — FILTERLINE NASAL ADULT O2/CO2

## (undated) DEVICE — GLOVE SURG SENSICARE SZ 7

## (undated) DEVICE — Device: Brand: DEFENDO AIR/WATER/SUCTION AND BIOPSY VALVE

## (undated) DEVICE — FORCEP RADIAL JAW 4

## (undated) DEVICE — ENDOSCOPY PACK - LOWER: Brand: MEDLINE INDUSTRIES, INC.

## (undated) NOTE — H&P (VIEW-ONLY)
Formatting of this note is different from the original.  Interventional Cardiology Consultation  [address] [place] Patient Status:  No patient class for patient encounter    7/3/1965 MRN [id number]   Location CARDIOLOGY - [facility] [place], [facility] Attending No att. providers found   Hosp Day # 0 PCP Ochoa Barbosa MD     Reason for Consultation:  Abnormal coronary CTA, chest pain    History of Present Illness:  Anabella Garza is a a(n) 62year old female with pmh HTN, HL, DM presenting for eval of abnormal coronary CTA. Patient reports few month h/o epigastric and lower chest pain described as pressure sensation. Notes increased fatigue with activity such as walking with associated dyspnea. Evaluated in [facility] recently and had cor CTA with abnormal findings as below, thus referred to interventional cardiology for further eval and management. Nonsmoker, denies family h/o heart disease. No other concerns or complaints.     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 ([facility] [address] 2016    OTHER DISEASES     uterine fibroids    Type II diabetes mellitus, uncontrolled 03/10/2014    Type II or unspecified type diabetes mellitus without mention of complication, not stated as uncontrolled around      Past Surgical History:   Procedure Laterality Date          COLONOSCOPY,DIAGNOSTIC N/A 2015    [place], hemorrhoids, repeat 10 years    D & C      EGD  2023    [place]: normal exam, normal gastric and dd bx    EXC SKIN BENIG 2.1-3CM TRUNK,ARM,LEG Left 2016    Procedure: EXCISION OF GROIN MASS;  Surgeon: Sabina Mccartney MD;  Location: [address] [place] [address]      done for fibroids ([place]); ovaries left    LAYR CLOS WND TRUNK,ARM,LEG <2.5CM Left 04/19/2016    Procedure: EXCISION OF GROIN MASS;  Surgeon: Sabina Mccartney MD;  Location: [address] [place] [address]     Family History   Problem Relation Age of Onset    Other (ROCK pos) Daughter     Other (hidradenitis supportiva) Daughter     Diabetes Maternal Grandmother     Eye Problems Maternal Grandmother         cataracts/iol    Breast Cancer Maternal Aunt 61        age 61    Diabetes Other         aunt      reports that she has never smoked. She has never used smokeless tobacco. She reports that she does not currently use alcohol. She reports that she does not use drugs. Allergies:  No Known Allergies    Medications:  aspirin 81 MG Oral Tab EC, Take 1 tablet (81 mg total) by mouth daily. , Disp: , Rfl: 0  metoprolol succinate ER (TOPROL XL) 25 MG Oral Tablet 24 Hr, Take 1 tablet (25 mg total) by mouth daily. , Disp: 90 tablet, Rfl: 0  Omeprazole 40 MG Oral Capsule Delayed Release, Take 1 capsule (40 mg total) by mouth daily. Before meal, Disp: 30 capsule, Rfl: 1  Insulin Pen Needle (BD PEN NEEDLE ANASTASIA 2ND GEN) 32G X 4 MM Does not apply Misc, USE FOUR TIMES DAILY, Disp: 400 each, Rfl: 3  amLODIPine 2.5 MG Oral Tab, Take 1 tablet (2.5 mg total) by mouth daily. , Disp: 90 tablet, Rfl: 1  BASAGLAR KWIKPEN 100 UNIT/ML Subcutaneous Solution Pen-injector, ADMINISTER 50 UNITS UNDER THE SKIN DAILY, Disp: 45 mL, Rfl: 0  linaCLOtide (LINZESS) 290 MCG Oral Cap, Take 1 capsule (290 mcg total) by mouth daily. , Disp: 30 capsule, Rfl: 5  losartan 100 MG Oral Tab, Take 1 tablet (100 mg total) by mouth daily. , Disp: 90 tablet, Rfl: 3  Glucose Blood (CONTOUR NEXT TEST) In Vitro Strip, USE TO TEST BLOOD SUGAR THREE TIMES DAILY, Disp: 300 strip, Rfl: 2  NOVOLOG FLEXPEN 100 UNIT/ML Subcutaneous Solution Pen-injector, Inject 8-16 Units into the skin 3 (three) times daily with meals. , Disp: 45 mL, Rfl: 3  Insulin Pen Needle (BD PEN NEEDLE ANASTASIA U/F) 32G X 4 MM Does not apply Misc, 1 strip by In Vitro route 4 (four) times daily., Disp: 400 each, Rfl: 1  BD PEN NEEDLE ANASTASIA U/F 32G X 4 MM Does not apply Misc, Use twice daily with Lantus & Novolog insulin pens, Disp: 100 each, Rfl: 6  Lancets (ASCENSIA MICROLET) Does not apply Misc, Test 3 times daily, Disp: 100 Each, Rfl: 11  atorvastatin (LIPITOR) 20 MG Oral Tab, Take 1 tablet (20 mg total) by mouth daily. (Patient not taking: Reported on 4/19/2023), Disp: 90 tablet, Rfl: 3    metoprolol tartrate (Lopressor) 50 MG tab, , , ,     Review of Systems:  Constitutional: denies fevers, chills, night sweats  HEENT: denies headache, vision changes, trouble or pain with swallowing  Cardiac: + chest/epigastric pain as per hpi  Pulm: denies dyspnea, cough, wheeze  GI: denies n/v, abd pain, diarrhea or constipation  : denies hematuria, dysuria, incontinence  MSK: denies muscle or joint pains  Neuro: denies numbness, weakness, paresthesias  Psych: denies anxiety, depression  Integument: denies skin rashes or lesions  Heme: denies easy bruising or bleeding  Endo: denies heat/cold intolerance, skin or nail changes    Physical Exam:  Blood pressure 144/80, pulse 80, height 5' 7\" (1.702 m), weight 200 lb (90.7 kg), not currently breastfeeding. Wt Readings from Last 3 Encounters:  04/19/23 : 200 lb (90.7 kg)  04/17/23 : 200 lb (90.7 kg)  04/14/23 : 200 lb (90.7 kg)    General: awake, alert, oriented x 3, no acute distress  HEENT: at/nc, perrl, eomi  Neck: No JVD, carotids 2+ no bruits. Cardiac: Regular rate and rhythm, S1, S2 normal, no murmur, rub or gallop. Lungs: Clear without wheezes, rales, rhonchi or dullness. Normal excursions and effort. Abdomen: Soft, non-tender, non-distended, normal bowel sounds   Extremities: Without clubbing, cyanosis or edema. Peripheral pulses are 2+. Neurologic: Alert and oriented, normal affect. Psych: normal mood and affect  Skin: Warm and dry. Laboratories and Data:  Diagnostics:    Cor CTA:  1.  There is obstructive coronary artery disease (>75% stenosis) involving the ostium of the second diagonal artery - relatively small artery. There is possibly obstructive coronary artery disease (50-75% stenosis) involving the mid-distal LAD and OM1. Non-obstructive CAD is noted in the proximal LAD and proximal to mid LCx. 2. The coronary artery calcium score is 28.4 Agatston units. 3. The visualized thoracic aorta is normal in size. 4. Please refer to the radiologist report for over-read of non-cardiac structures.     Distal LAD:  0.62  Diagonal 1:  0.78  LCx: 0.94  OM1:  0.76  RCA: 0.91    Labs:   CBC:    Lab Results   Component Value Date    WBC 7.00 02/13/2023    WBC 8.83 11/22/2022    WBC 7.08 08/09/2021     Lab Results   Component Value Date    HEMOGLOBIN 15.0 04/11/2016    HEMOGLOBIN 12.7 01/30/2012    HEMOGLOBIN 11.6 11/07/2011    HGB 15.0 02/13/2023    HGB 14.4 11/22/2022    HGB 14.1 08/09/2021     Lab Results   Component Value Date     02/13/2023     11/22/2022     08/09/2021     BMP:     Lab Results   Component Value Date    GLUCOSE 224 (H) 04/11/2016    GLUCOSE 137 (H) 04/29/2015    GLUCOSE 190 (H) 03/04/2013     Lab Results   Component Value Date    K 4.3 01/06/2023    K 4.20 11/16/2022    K 4.14 08/09/2021     Lab Results   Component Value Date    BUN 13.0 02/13/2023    BUN 16.0 01/06/2023    BUN <1.4 (L) 01/04/2023     Lab Results   Component Value Date    CREATSERUM 0.48 (L) 02/13/2023    CREATSERUM 0.54 01/06/2023    CREATSERUM 0.45 (L) 01/04/2023     Cholesterol:     Lab Results   Component Value Date    CHOLEST 180.00 11/16/2022    CHOLEST 171.00 08/09/2021    CHOLEST 148.00 02/11/2020     Lab Results   Component Value Date    HDL 49 11/16/2022    HDL 51 08/09/2021    HDL 48 (L) 02/11/2020     Lab Results   Component Value Date    TRIG 89.00 11/16/2022    TRIG 94.00 08/09/2021    TRIG 79.00 02/11/2020    TRIGLY 64 05/02/2016    TRIGLY 59 04/29/2015    TRIGLY 74 03/04/2013     Lab Results   Component Value Date     11/16/2022     08/09/2021    LDL 84 02/11/2020     Lab Results   Component Value Date    AST 20 02/13/2023    AST 18 11/22/2022    AST 19 08/09/2021     Lab Results   Component Value Date    ALT 23 02/13/2023    ALT 23 11/22/2022    ALT 21 02/11/2020     Assessment/Plan:  62year old female with pmh HTN, HL, DM presenting for eval of chest/epigastric pain and abnormal coronary CTA    1) Chest pain/CAD:  - Cor CTA 4/2023 with obstructive CAD in diagonal branch, possibly obstructive disease in mid-distal LAD and OM1; CT FFR abnormal LAD, diag, OM1  - Patient reports symptoms as per hpi  - Cont asa, statin, amlodipine, losartan  - LHC for definitive assessment of coronary anatomy with PCI as needed; risks/benefits/indications discussed with patient and  at the bedside who are agreeable  - TTE to assess LV structure/function; will schedule after LHC    2) HTN:  - /80 today  - Cont amlodipine, losartan  - Start beta blocker after LHC    3) HL:  - Lipids 11/2022 as above  - Recently started on atorvastatin  - Repeat lipids/LFTs 3 months    4) RHM:  - Heart healthy diet  - Medication compliance    Follow up after Aura Orta MD  Interventional cardiologist, [facility]  4/19/2023  5:46 PM    Electronically signed by Cezar Zuñiga MD at 04/19/2023  5:51 PM CDT